# Patient Record
Sex: FEMALE | Race: WHITE | NOT HISPANIC OR LATINO | Employment: FULL TIME | URBAN - METROPOLITAN AREA
[De-identification: names, ages, dates, MRNs, and addresses within clinical notes are randomized per-mention and may not be internally consistent; named-entity substitution may affect disease eponyms.]

---

## 2020-03-20 ENCOUNTER — OFFICE VISIT (OUTPATIENT)
Dept: URGENT CARE | Facility: CLINIC | Age: 40
End: 2020-03-20
Payer: COMMERCIAL

## 2020-03-20 VITALS
DIASTOLIC BLOOD PRESSURE: 88 MMHG | HEIGHT: 67 IN | RESPIRATION RATE: 20 BRPM | BODY MASS INDEX: 32.96 KG/M2 | OXYGEN SATURATION: 100 % | TEMPERATURE: 97.7 F | HEART RATE: 101 BPM | SYSTOLIC BLOOD PRESSURE: 138 MMHG | WEIGHT: 210 LBS

## 2020-03-20 DIAGNOSIS — J40 BRONCHITIS: ICD-10-CM

## 2020-03-20 PROCEDURE — 99214 OFFICE O/P EST MOD 30 MIN: CPT | Performed by: PHYSICIAN ASSISTANT

## 2020-03-20 RX ORDER — ALBUTEROL SULFATE 90 UG/1
2 AEROSOL, METERED RESPIRATORY (INHALATION) EVERY 6 HOURS PRN
Qty: 1 INHALER | Refills: 0 | Status: SHIPPED | OUTPATIENT
Start: 2020-03-20 | End: 2020-07-24

## 2020-03-20 RX ORDER — PREDNISONE 20 MG/1
TABLET ORAL
Qty: 10 TAB | Refills: 0 | Status: SHIPPED | OUTPATIENT
Start: 2020-03-20 | End: 2020-07-24

## 2020-03-20 RX ORDER — AZITHROMYCIN 250 MG/1
TABLET, FILM COATED ORAL
Qty: 6 TAB | Refills: 0 | Status: SHIPPED | OUTPATIENT
Start: 2020-03-20 | End: 2020-07-24

## 2020-03-22 ASSESSMENT — ENCOUNTER SYMPTOMS
WHEEZING: 1
PALPITATIONS: 0
SORE THROAT: 1
SHORTNESS OF BREATH: 0
HEMOPTYSIS: 0
FEVER: 0
CHILLS: 0
COUGH: 1
SPUTUM PRODUCTION: 0

## 2020-03-22 NOTE — PROGRESS NOTES
Subjective:      Marissa Molina is a 39 y.o. female who presents with Cough (x6days, cough, wheezing, sob)            Cough   This is a new problem. The current episode started in the past 7 days. The problem occurs constantly. The cough is non-productive. Associated symptoms include a sore throat and wheezing. Pertinent negatives include no chest pain, chills, ear pain, fever, hemoptysis or shortness of breath. Nothing aggravates the symptoms. She has tried OTC cough suppressant for the symptoms. The treatment provided no relief.       Review of Systems   Constitutional: Positive for malaise/fatigue. Negative for chills and fever.   HENT: Positive for congestion and sore throat. Negative for ear pain.    Respiratory: Positive for cough and wheezing. Negative for hemoptysis, sputum production and shortness of breath.    Cardiovascular: Negative for chest pain and palpitations.   All other systems reviewed and are negative.    PMH:  has no past medical history on file.  MEDS:   Current Outpatient Medications:   •  predniSONE (DELTASONE) 20 MG Tab, Take 40 mg by mouth once daily for 5 days., Disp: 10 Tab, Rfl: 0  •  albuterol (PROAIR HFA) 108 (90 Base) MCG/ACT Aero Soln inhalation aerosol, Inhale 2 Puffs by mouth every 6 hours as needed for Shortness of Breath., Disp: 1 Inhaler, Rfl: 0  •  azithromycin (ZITHROMAX) 250 MG Tab, Take 500 mg (2 Tabs) by mouth on day one; then take 250 mg (1 Tab) by mouth once daily for 4 days., Disp: 6 Tab, Rfl: 0  ALLERGIES:   Allergies   Allergen Reactions   • Penicillins Hives     SURGHX: History reviewed. No pertinent surgical history.  SOCHX:  reports that she has been smoking. She has never used smokeless tobacco.  FH: Family history was reviewed, no pertinent findings to report  Medications, Allergies, and current problem list reviewed today in Epic       Objective:     Blood Pressure 138/88 (BP Location: Left arm, Patient Position: Sitting, BP Cuff Size: Adult)   Pulse (Abnormal)  "101   Temperature 36.5 °C (97.7 °F) (Temporal)   Respiration 20   Height 1.702 m (5' 7\")   Weight 95.3 kg (210 lb)   Oxygen Saturation 100%   Body Mass Index 32.89 kg/m²      Physical Exam  Vitals signs reviewed.   Constitutional:       General: She is not in acute distress.     Appearance: She is well-developed. She is not ill-appearing or toxic-appearing.      Interventions: She is not intubated.  HENT:      Head: Normocephalic and atraumatic.      Right Ear: Hearing, tympanic membrane, ear canal and external ear normal.      Left Ear: Hearing, tympanic membrane, ear canal and external ear normal.      Nose: Nose normal.      Mouth/Throat:      Pharynx: Uvula midline.   Eyes:      General: Lids are normal.      Conjunctiva/sclera: Conjunctivae normal.   Neck:      Musculoskeletal: Full passive range of motion without pain, normal range of motion and neck supple.   Cardiovascular:      Rate and Rhythm: Regular rhythm.      Heart sounds: Normal heart sounds, S1 normal and S2 normal. No murmur. No friction rub. No gallop.    Pulmonary:      Effort: Pulmonary effort is normal. No tachypnea, bradypnea, accessory muscle usage or respiratory distress. She is not intubated.      Breath sounds: Wheezing present. No decreased breath sounds, rhonchi or rales.   Chest:      Chest wall: No tenderness.   Musculoskeletal: Normal range of motion.   Skin:     General: Skin is warm and dry.   Neurological:      Mental Status: She is alert and oriented to person, place, and time.   Psychiatric:         Speech: Speech normal.         Behavior: Behavior normal.         Thought Content: Thought content normal.         Judgment: Judgment normal.                 Assessment/Plan:   Discussed symptoms are likely a result of a viral process.  Contingent antibiotic prescription given to patient to fill upon meeting criteria of guidelines discussed.     1. Bronchitis    - predniSONE (DELTASONE) 20 MG Tab; Take 40 mg by mouth once daily " for 5 days.  Dispense: 10 Tab; Refill: 0  - albuterol (PROAIR HFA) 108 (90 Base) MCG/ACT Aero Soln inhalation aerosol; Inhale 2 Puffs by mouth every 6 hours as needed for Shortness of Breath.  Dispense: 1 Inhaler; Refill: 0  - azithromycin (ZITHROMAX) 250 MG Tab; Take 500 mg (2 Tabs) by mouth on day one; then take 250 mg (1 Tab) by mouth once daily for 4 days.  Dispense: 6 Tab; Refill: 0    Differential diagnosis, natural history, supportive care discussed. Follow-up with primary care provider within 7-10 days, emergency room precautions discussed.  Patient and/or family appears understanding of information.  Handout and review of patients diagnosis and treatment was discussed extensively.

## 2020-07-23 ENCOUNTER — TELEPHONE (OUTPATIENT)
Dept: SCHEDULING | Facility: IMAGING CENTER | Age: 40
End: 2020-07-23

## 2020-07-23 SDOH — ECONOMIC STABILITY: HOUSING INSECURITY: IN THE LAST 12 MONTHS, HOW MANY PLACES HAVE YOU LIVED?: 1

## 2020-07-23 SDOH — HEALTH STABILITY: MENTAL HEALTH
STRESS IS WHEN SOMEONE FEELS TENSE, NERVOUS, ANXIOUS, OR CAN'T SLEEP AT NIGHT BECAUSE THEIR MIND IS TROUBLED. HOW STRESSED ARE YOU?: NOT AT ALL

## 2020-07-23 SDOH — ECONOMIC STABILITY: HOUSING INSECURITY
IN THE LAST 12 MONTHS, WAS THERE A TIME WHEN YOU DID NOT HAVE A STEADY PLACE TO SLEEP OR SLEPT IN A SHELTER (INCLUDING NOW)?: NO

## 2020-07-23 SDOH — ECONOMIC STABILITY: TRANSPORTATION INSECURITY
IN THE PAST 12 MONTHS, HAS THE LACK OF TRANSPORTATION KEPT YOU FROM MEDICAL APPOINTMENTS OR FROM GETTING MEDICATIONS?: NO

## 2020-07-23 SDOH — HEALTH STABILITY: PHYSICAL HEALTH: ON AVERAGE, HOW MANY MINUTES DO YOU ENGAGE IN EXERCISE AT THIS LEVEL?: 0 MINUTES

## 2020-07-23 SDOH — ECONOMIC STABILITY: INCOME INSECURITY: IN THE LAST 12 MONTHS, WAS THERE A TIME WHEN YOU WERE NOT ABLE TO PAY THE MORTGAGE OR RENT ON TIME?: NO

## 2020-07-23 SDOH — ECONOMIC STABILITY: TRANSPORTATION INSECURITY
IN THE PAST 12 MONTHS, HAS LACK OF RELIABLE TRANSPORTATION KEPT YOU FROM MEDICAL APPOINTMENTS, MEETINGS, WORK OR FROM GETTING THINGS NEEDED FOR DAILY LIVING?: NO

## 2020-07-23 SDOH — HEALTH STABILITY: PHYSICAL HEALTH: ON AVERAGE, HOW MANY DAYS PER WEEK DO YOU ENGAGE IN MODERATE TO STRENUOUS EXERCISE (LIKE A BRISK WALK)?: 0 DAYS

## 2020-07-23 ASSESSMENT — SOCIAL DETERMINANTS OF HEALTH (SDOH)
WITHIN THE PAST 12 MONTHS, YOU WORRIED THAT YOUR FOOD WOULD RUN OUT BEFORE YOU GOT THE MONEY TO BUY MORE: DECLINE
WITHIN THE PAST 12 MONTHS, THE FOOD YOU BOUGHT JUST DIDN'T LAST AND YOU DIDN'T HAVE MONEY TO GET MORE: DECLINE
HOW OFTEN DO YOU GET TOGETHER WITH FRIENDS OR RELATIVES?: NEVER
HOW HARD IS IT FOR YOU TO PAY FOR THE VERY BASICS LIKE FOOD, HOUSING, MEDICAL CARE, AND HEATING?: DECLINE
HOW OFTEN DO YOU ATTEND CHURCH OR RELIGIOUS SERVICES?: NEVER
HOW OFTEN DO YOU ATTENT MEETINGS OF THE CLUB OR ORGANIZATION YOU BELONG TO?: NEVER
DO YOU BELONG TO ANY CLUBS OR ORGANIZATIONS SUCH AS CHURCH GROUPS UNIONS, FRATERNAL OR ATHLETIC GROUPS, OR SCHOOL GROUPS?: NO
IN A TYPICAL WEEK, HOW MANY TIMES DO YOU TALK ON THE PHONE WITH FAMILY, FRIENDS, OR NEIGHBORS?: NEVER
HOW MANY DRINKS CONTAINING ALCOHOL DO YOU HAVE ON A TYPICAL DAY WHEN YOU ARE DRINKING: 5 OR 6
HOW OFTEN DO YOU HAVE SIX OR MORE DRINKS ON ONE OCCASION: NEVER
HOW OFTEN DO YOU HAVE A DRINK CONTAINING ALCOHOL: 2-4 TIMES A MONTH

## 2020-07-24 ENCOUNTER — OFFICE VISIT (OUTPATIENT)
Dept: MEDICAL GROUP | Age: 40
End: 2020-07-24
Payer: COMMERCIAL

## 2020-07-24 ENCOUNTER — HOSPITAL ENCOUNTER (OUTPATIENT)
Dept: LAB | Facility: MEDICAL CENTER | Age: 40
End: 2020-07-24
Attending: FAMILY MEDICINE
Payer: COMMERCIAL

## 2020-07-24 VITALS
SYSTOLIC BLOOD PRESSURE: 112 MMHG | HEART RATE: 89 BPM | OXYGEN SATURATION: 100 % | TEMPERATURE: 98.3 F | BODY MASS INDEX: 35.82 KG/M2 | HEIGHT: 67 IN | WEIGHT: 228.2 LBS | DIASTOLIC BLOOD PRESSURE: 82 MMHG

## 2020-07-24 DIAGNOSIS — Z00.00 ANNUAL PHYSICAL EXAM: ICD-10-CM

## 2020-07-24 DIAGNOSIS — Z00.00 ENCOUNTER FOR MEDICAL EXAMINATION TO ESTABLISH CARE: ICD-10-CM

## 2020-07-24 DIAGNOSIS — F17.200 SMOKING: ICD-10-CM

## 2020-07-24 DIAGNOSIS — E66.9 OBESITY (BMI 30-39.9): ICD-10-CM

## 2020-07-24 DIAGNOSIS — R23.4 CRACKED SKIN ON FEET: ICD-10-CM

## 2020-07-24 LAB
25(OH)D3 SERPL-MCNC: 30 NG/ML (ref 30–100)
ALBUMIN SERPL BCP-MCNC: 4.7 G/DL (ref 3.2–4.9)
ALBUMIN/GLOB SERPL: 1.7 G/DL
ALP SERPL-CCNC: 60 U/L (ref 30–99)
ALT SERPL-CCNC: 26 U/L (ref 2–50)
ANION GAP SERPL CALC-SCNC: 17 MMOL/L (ref 7–16)
AST SERPL-CCNC: 19 U/L (ref 12–45)
BILIRUB SERPL-MCNC: 0.4 MG/DL (ref 0.1–1.5)
BUN SERPL-MCNC: 11 MG/DL (ref 8–22)
CALCIUM SERPL-MCNC: 9.5 MG/DL (ref 8.5–10.5)
CHLORIDE SERPL-SCNC: 101 MMOL/L (ref 96–112)
CHOLEST SERPL-MCNC: 209 MG/DL (ref 100–199)
CO2 SERPL-SCNC: 19 MMOL/L (ref 20–33)
CREAT SERPL-MCNC: 0.82 MG/DL (ref 0.5–1.4)
ERYTHROCYTE [DISTWIDTH] IN BLOOD BY AUTOMATED COUNT: 41.9 FL (ref 35.9–50)
FASTING STATUS PATIENT QL REPORTED: NORMAL
GLOBULIN SER CALC-MCNC: 2.8 G/DL (ref 1.9–3.5)
GLUCOSE SERPL-MCNC: 87 MG/DL (ref 65–99)
HCT VFR BLD AUTO: 46.8 % (ref 37–47)
HDLC SERPL-MCNC: 59 MG/DL
HGB BLD-MCNC: 15.8 G/DL (ref 12–16)
LDLC SERPL CALC-MCNC: 135 MG/DL
MCH RBC QN AUTO: 29.8 PG (ref 27–33)
MCHC RBC AUTO-ENTMCNC: 33.8 G/DL (ref 33.6–35)
MCV RBC AUTO: 88.1 FL (ref 81.4–97.8)
PLATELET # BLD AUTO: 213 K/UL (ref 164–446)
PMV BLD AUTO: 12.3 FL (ref 9–12.9)
POTASSIUM SERPL-SCNC: 4 MMOL/L (ref 3.6–5.5)
PROT SERPL-MCNC: 7.5 G/DL (ref 6–8.2)
RBC # BLD AUTO: 5.31 M/UL (ref 4.2–5.4)
SODIUM SERPL-SCNC: 137 MMOL/L (ref 135–145)
T3FREE SERPL-MCNC: 3.66 PG/ML (ref 2–4.4)
T4 FREE SERPL-MCNC: 1.41 NG/DL (ref 0.93–1.7)
TRIGL SERPL-MCNC: 75 MG/DL (ref 0–149)
TSH SERPL DL<=0.005 MIU/L-ACNC: 1.06 UIU/ML (ref 0.38–5.33)
WBC # BLD AUTO: 10 K/UL (ref 4.8–10.8)

## 2020-07-24 PROCEDURE — 36415 COLL VENOUS BLD VENIPUNCTURE: CPT

## 2020-07-24 PROCEDURE — 80061 LIPID PANEL: CPT

## 2020-07-24 PROCEDURE — 80053 COMPREHEN METABOLIC PANEL: CPT

## 2020-07-24 PROCEDURE — 85027 COMPLETE CBC AUTOMATED: CPT

## 2020-07-24 PROCEDURE — 82306 VITAMIN D 25 HYDROXY: CPT

## 2020-07-24 PROCEDURE — 99214 OFFICE O/P EST MOD 30 MIN: CPT | Performed by: FAMILY MEDICINE

## 2020-07-24 PROCEDURE — 84439 ASSAY OF FREE THYROXINE: CPT

## 2020-07-24 PROCEDURE — 84481 FREE ASSAY (FT-3): CPT

## 2020-07-24 PROCEDURE — 84443 ASSAY THYROID STIM HORMONE: CPT

## 2020-07-24 RX ORDER — BUPROPION HYDROCHLORIDE 150 MG/1
TABLET ORAL
Qty: 90 TAB | Refills: 0 | Status: SHIPPED | OUTPATIENT
Start: 2020-07-24 | End: 2020-08-17

## 2020-07-24 RX ORDER — CLOBETASOL PROPIONATE 0.5 MG/G
EMULSION TOPICAL
Qty: 75 G | Refills: 2 | Status: SHIPPED | OUTPATIENT
Start: 2020-07-24 | End: 2021-02-24

## 2020-07-24 RX ORDER — NICOTINE 21 MG/24HR
PATCH, TRANSDERMAL 24 HOURS TRANSDERMAL
Qty: 42 PATCH | Refills: 0 | Status: SHIPPED | OUTPATIENT
Start: 2020-07-24 | End: 2021-03-12

## 2020-07-24 ASSESSMENT — PATIENT HEALTH QUESTIONNAIRE - PHQ9: CLINICAL INTERPRETATION OF PHQ2 SCORE: 0

## 2020-07-24 NOTE — PROGRESS NOTES
This medical record contains text that has been entered with the assistance of computer voice recognition and dictation software.  Therefore, it may contain unintended errors in text, spelling, punctuation, or grammar      Chief Complaint   Patient presents with   • Establish Care   • Stress   • Weight Gain     x6 months   • Tired   • Body Aches         Marissa Molina is a 39 y.o. female here evaluation and management of: establish care      HPI:     1. Encounter for medical examination to establish care  The patient is a 39-year-old female who presents to clinic with a chief complaint of wanting to establish care and cracked skin in the feet as well as smoking cessation help.    Today the patient denied any chest pain, shortness of breath, fevers or chills.    Past medical history above and below    Family History of Cancer---MGM  of leukemia at age 31    Family History of CAD---none      Social History        Occupation----works at [employer]    Exercise---not as much anymore      Pets---2 dogs, and 2 guinee pigs,  doodle and husky (Bk and Ace)        2. Obesity (BMI 30-39.9)  Patient states she just feels tired so that is why she does not exercise she comes home from work and she is absolutely tired.  She knows she needs to start exercise regimen.    4. Smoking  Patient states she is tried smoking cessation in the past Chantix made her feel very depressed she tried the nicotine patch we did not work.  But she is interested in stopping.  Denies any shortness of breath no coughing up blood.    5. Cracked skin on feet  Patient states she has this dry itchy flaky cracked skin on bilateral heels.  Current medicines (including changes today)  Current Outpatient Medications   Medication Sig Dispense Refill   • nicotine (NICODERM) 21 MG/24HR PATCH 24 HR NRT patch 21mg/day x 6wks followed by 14mg/day x 2wks followed by 7mg/day x 2wks, in combination with  buproprion for 7-12 wks. 42 Patch 0   •  "buPROPion (WELLBUTRIN XL) 150 MG XL tablet 150 mg once daily for 3 days; increase to 150 mg twice daily (maximum dose: 300 mg/day 90 Tab 0   • CLOBETASOL PROPIONATE E 0.05 % Cream AAA BID FOR UP TO 14 DAYS THEN STOP 75 g 2     No current facility-administered medications for this visit.      She  has no past medical history on file.  She  has a past surgical history that includes primary c section.  Social History     Tobacco Use   • Smoking status: Current Some Day Smoker     Packs/day: 0.75   • Smokeless tobacco: Never Used   Substance Use Topics   • Alcohol Use     Frequency: 2-4 times a month     Drinks per session: 5 or 6     Binge frequency: Never   • Drug use: Not on file     Social History     Social History Narrative   • Not on file     Family History   Problem Relation Age of Onset   • No Known Problems Mother    • No Known Problems Father    • No Known Problems Sister    • No Known Problems Brother    • No Known Problems Sister      Family Status   Relation Name Status   • Mo  Alive   • Fa  Alive   • Sis  Alive   • Bro  Alive   • Sis  Alive         ROS    The pertinent  ROS findings can be seen in the HPI above.     All other systems reviewed and are negative     Objective:     /82 (BP Location: Left arm, Patient Position: Sitting, BP Cuff Size: Adult)   Pulse 89   Temp 36.8 °C (98.3 °F) (Temporal)   Ht 1.702 m (5' 7\")   Wt 103.5 kg (228 lb 3.2 oz)   SpO2 100%  Body mass index is 35.74 kg/m².      Physical Exam:    Constitutional: Alert, no distress.  Skin: Dry flaky skin noted on bilateral heels  Eye: Equal, round and reactive, conjunctiva clear, lids normal.  ENMT: Lips without lesions, good dentition, oropharynx clear.  Neck: Trachea midline, no masses, no thyromegaly. No cervical or supraclavicular lymphadenopathy.  Respiratory: Unlabored respiratory effort, lungs clear to auscultation, no wheezes, no ronchi.  Cardiovascular: Normal S1, S2, no murmur, no edema  Abdomen: Soft, non-tender, no " masses, no hepatosplenomegaly.        Assessment and Plan:   The following treatment plan was discussed      1. Encounter for medical examination to establish care  Care has been established  We need  baseline labs to establish a clinical profile      Age-appropriate preventive services recommended by USPTF guidelines and ACIP were discussed today.    Requested any outside Medical records to be sent to us  Denies intimate partner viloence  Discussed seat belt safety        2. Annual physical exam    Due for annual labs    - Comp Metabolic Panel; Future  - CBC WITHOUT DIFFERENTIAL; Future  - Lipid Profile; Future  - TSH+FREE T4  - T3 FREE; Future  - VITAMIN D,25 HYDROXY; Future    3. Obesity (BMI 30-39.9)  We specifically discussed a comprehensive lifestyle intervention which includes combination of diet exercise and behavioral modification.  We discussed self-monitoring of weight, food intake and exercise logging.  We discussed that people with obesity have learned maladaptive patterns of eating and exercise that are contributing to their weight gain or maintenance of their overweight state.  The second is that behaviors can be modified and that weight loss will result if the patient is persisting.      4. Smoking    Patient was counseled on smoking cessation, we discussed the benefits of quitting including decrease risk of MI by 50% after one year of cessation, decreased risk of lung cancer after 12 years, one cigarette is enough to paralyze cilia for 24 hours leading to increase risk of pulmonary infection, etc.... .Also encouraged to set a stop date.    Patient desires to begin pharmacotherapy to prevent withdrawal symptoms and aid in cessation. Will use NRT patch 21mg/day x 6wks followed by 14mg/day x 2wks followed by 7mg/day x 2wks, in combination with  buproprion for 7-12 wks.    - nicotine (NICODERM) 21 MG/24HR PATCH 24 HR; NRT patch 21mg/day x 6wks followed by 14mg/day x 2wks followed by 7mg/day x 2wks, in  combination with  buproprion for 7-12 wks.  Dispense: 42 Patch; Refill: 0  - buPROPion (WELLBUTRIN XL) 150 MG XL tablet; 150 mg once daily for 3 days; increase to 150 mg twice daily (maximum dose: 300 mg/day  Dispense: 90 Tab; Refill: 0    5. Cracked skin on feet    - CLOBETASOL PROPIONATE E 0.05 % Cream; AAA BID FOR UP TO 14 DAYS THEN STOP  Dispense: 75 g; Refill: 2        Instructed to Follow up in clinic or ER for worsening symptoms, difficulty breathing, lack of expected recovery, or should new symptoms or problems arise.    Followup: Return in about 3 months (around 10/24/2020) for Reevaluation, labs.       Once again this medical record contains text that has been entered with the assistance of computer voice recognition and dictation software.  Therefore, it may contain unintended errors in text, spelling, punctuation, or grammar

## 2020-08-16 DIAGNOSIS — F17.200 SMOKING: ICD-10-CM

## 2020-08-19 RX ORDER — BUPROPION HYDROCHLORIDE 150 MG/1
TABLET ORAL
Qty: 60 TAB | Refills: 0 | Status: SHIPPED | OUTPATIENT
Start: 2020-08-19 | End: 2020-09-13 | Stop reason: SDUPTHER

## 2020-08-25 RX ORDER — NICOTINE 21 MG/24HR
1 PATCH, TRANSDERMAL 24 HOURS TRANSDERMAL EVERY 24 HOURS
Qty: 30 PATCH | Refills: 0 | Status: SHIPPED | OUTPATIENT
Start: 2020-08-25 | End: 2021-03-12

## 2020-09-13 DIAGNOSIS — F17.200 SMOKING: ICD-10-CM

## 2020-09-14 RX ORDER — BUPROPION HYDROCHLORIDE 150 MG/1
TABLET ORAL
Qty: 60 TAB | Refills: 0 | Status: SHIPPED | OUTPATIENT
Start: 2020-09-14 | End: 2020-10-08

## 2020-10-08 DIAGNOSIS — F17.200 SMOKING: ICD-10-CM

## 2020-10-08 RX ORDER — BUPROPION HYDROCHLORIDE 150 MG/1
TABLET ORAL
Qty: 180 TAB | Refills: 0 | Status: SHIPPED | OUTPATIENT
Start: 2020-10-08 | End: 2020-10-20 | Stop reason: SDUPTHER

## 2020-10-20 DIAGNOSIS — F17.200 SMOKING: ICD-10-CM

## 2020-10-21 RX ORDER — BUPROPION HYDROCHLORIDE 150 MG/1
TABLET ORAL
Qty: 180 TAB | Refills: 0 | Status: SHIPPED | OUTPATIENT
Start: 2020-10-21 | End: 2020-12-16 | Stop reason: SDUPTHER

## 2020-12-16 DIAGNOSIS — F17.200 SMOKING: ICD-10-CM

## 2020-12-18 RX ORDER — BUPROPION HYDROCHLORIDE 150 MG/1
TABLET ORAL
Qty: 180 TAB | Refills: 0 | Status: SHIPPED | OUTPATIENT
Start: 2020-12-18 | End: 2021-03-08

## 2021-02-18 ENCOUNTER — OFFICE VISIT (OUTPATIENT)
Dept: URGENT CARE | Facility: CLINIC | Age: 41
End: 2021-02-18
Payer: COMMERCIAL

## 2021-02-18 ENCOUNTER — APPOINTMENT (OUTPATIENT)
Dept: RADIOLOGY | Facility: IMAGING CENTER | Age: 41
End: 2021-02-18
Attending: PHYSICIAN ASSISTANT
Payer: COMMERCIAL

## 2021-02-18 ENCOUNTER — HOSPITAL ENCOUNTER (EMERGENCY)
Facility: MEDICAL CENTER | Age: 41
End: 2021-02-19
Attending: EMERGENCY MEDICINE | Admitting: EMERGENCY MEDICINE
Payer: COMMERCIAL

## 2021-02-18 VITALS
WEIGHT: 240 LBS | HEIGHT: 66 IN | HEART RATE: 115 BPM | SYSTOLIC BLOOD PRESSURE: 124 MMHG | BODY MASS INDEX: 38.57 KG/M2 | TEMPERATURE: 97.5 F | OXYGEN SATURATION: 98 % | DIASTOLIC BLOOD PRESSURE: 80 MMHG | RESPIRATION RATE: 16 BRPM

## 2021-02-18 DIAGNOSIS — R07.9 RIGHT-SIDED CHEST PAIN: ICD-10-CM

## 2021-02-18 DIAGNOSIS — R94.31 ABNORMAL EKG: ICD-10-CM

## 2021-02-18 DIAGNOSIS — S22.31XA CLOSED FRACTURE OF ONE RIB OF RIGHT SIDE, INITIAL ENCOUNTER: ICD-10-CM

## 2021-02-18 LAB
ALBUMIN SERPL BCP-MCNC: 4.1 G/DL (ref 3.2–4.9)
ALBUMIN/GLOB SERPL: 1.4 G/DL
ALP SERPL-CCNC: 69 U/L (ref 30–99)
ALT SERPL-CCNC: 25 U/L (ref 2–50)
ANION GAP SERPL CALC-SCNC: 10 MMOL/L (ref 7–16)
AST SERPL-CCNC: 23 U/L (ref 12–45)
BASOPHILS # BLD AUTO: 0.5 % (ref 0–1.8)
BASOPHILS # BLD: 0.09 K/UL (ref 0–0.12)
BILIRUB SERPL-MCNC: 0.4 MG/DL (ref 0.1–1.5)
BUN SERPL-MCNC: 16 MG/DL (ref 8–22)
CALCIUM SERPL-MCNC: 9 MG/DL (ref 8.4–10.2)
CHLORIDE SERPL-SCNC: 104 MMOL/L (ref 96–112)
CO2 SERPL-SCNC: 21 MMOL/L (ref 20–33)
CREAT SERPL-MCNC: 0.92 MG/DL (ref 0.5–1.4)
EKG IMPRESSION: NORMAL
EOSINOPHIL # BLD AUTO: 0.25 K/UL (ref 0–0.51)
EOSINOPHIL NFR BLD: 1.4 % (ref 0–6.9)
ERYTHROCYTE [DISTWIDTH] IN BLOOD BY AUTOMATED COUNT: 42.7 FL (ref 35.9–50)
GLOBULIN SER CALC-MCNC: 3 G/DL (ref 1.9–3.5)
GLUCOSE SERPL-MCNC: 115 MG/DL (ref 65–99)
HCG SERPL QL: NEGATIVE
HCT VFR BLD AUTO: 43.9 % (ref 37–47)
HGB BLD-MCNC: 14.4 G/DL (ref 12–16)
IMM GRANULOCYTES # BLD AUTO: 0.06 K/UL (ref 0–0.11)
IMM GRANULOCYTES NFR BLD AUTO: 0.3 % (ref 0–0.9)
LIPASE SERPL-CCNC: 24 U/L (ref 7–58)
LYMPHOCYTES # BLD AUTO: 3.03 K/UL (ref 1–4.8)
LYMPHOCYTES NFR BLD: 17.6 % (ref 22–41)
MCH RBC QN AUTO: 28.6 PG (ref 27–33)
MCHC RBC AUTO-ENTMCNC: 32.8 G/DL (ref 33.6–35)
MCV RBC AUTO: 87.3 FL (ref 81.4–97.8)
MONOCYTES # BLD AUTO: 0.89 K/UL (ref 0–0.85)
MONOCYTES NFR BLD AUTO: 5.2 % (ref 0–13.4)
NEUTROPHILS # BLD AUTO: 12.93 K/UL (ref 2–7.15)
NEUTROPHILS NFR BLD: 75 % (ref 44–72)
NRBC # BLD AUTO: 0 K/UL
NRBC BLD-RTO: 0 /100 WBC
PLATELET # BLD AUTO: 214 K/UL (ref 164–446)
PMV BLD AUTO: 11.5 FL (ref 9–12.9)
POTASSIUM SERPL-SCNC: 3.6 MMOL/L (ref 3.6–5.5)
PROT SERPL-MCNC: 7.1 G/DL (ref 6–8.2)
RBC # BLD AUTO: 5.03 M/UL (ref 4.2–5.4)
SODIUM SERPL-SCNC: 135 MMOL/L (ref 135–145)
TROPONIN T SERPL-MCNC: 12 NG/L (ref 6–19)
WBC # BLD AUTO: 17.3 K/UL (ref 4.8–10.8)

## 2021-02-18 PROCEDURE — 85379 FIBRIN DEGRADATION QUANT: CPT

## 2021-02-18 PROCEDURE — 36415 COLL VENOUS BLD VENIPUNCTURE: CPT

## 2021-02-18 PROCEDURE — 71046 X-RAY EXAM CHEST 2 VIEWS: CPT | Mod: TC | Performed by: PHYSICIAN ASSISTANT

## 2021-02-18 PROCEDURE — 93000 ELECTROCARDIOGRAM COMPLETE: CPT | Performed by: PHYSICIAN ASSISTANT

## 2021-02-18 PROCEDURE — 83690 ASSAY OF LIPASE: CPT

## 2021-02-18 PROCEDURE — 85025 COMPLETE CBC W/AUTO DIFF WBC: CPT

## 2021-02-18 PROCEDURE — 80053 COMPREHEN METABOLIC PANEL: CPT

## 2021-02-18 PROCEDURE — 99283 EMERGENCY DEPT VISIT LOW MDM: CPT

## 2021-02-18 PROCEDURE — 93005 ELECTROCARDIOGRAM TRACING: CPT | Performed by: EMERGENCY MEDICINE

## 2021-02-18 PROCEDURE — 99215 OFFICE O/P EST HI 40 MIN: CPT | Performed by: PHYSICIAN ASSISTANT

## 2021-02-18 PROCEDURE — 84484 ASSAY OF TROPONIN QUANT: CPT

## 2021-02-18 PROCEDURE — 84703 CHORIONIC GONADOTROPIN ASSAY: CPT

## 2021-02-18 ASSESSMENT — ENCOUNTER SYMPTOMS
WHEEZING: 0
FEVER: 0
SENSORY CHANGE: 0
NEAR-SYNCOPE: 0
SHORTNESS OF BREATH: 0
SYNCOPE: 0
EXERTIONAL CHEST PRESSURE: 0
CHILLS: 0
LOWER EXTREMITY EDEMA: 0
PALPITATIONS: 0
COUGH: 0
HEMOPTYSIS: 0
FOCAL WEAKNESS: 0
LEG PAIN: 0
SPUTUM PRODUCTION: 0
IRREGULAR HEARTBEAT: 0
NAUSEA: 0
BACK PAIN: 0
TINGLING: 0
VOMITING: 0
ABDOMINAL PAIN: 0
DIZZINESS: 0

## 2021-02-18 ASSESSMENT — FIBROSIS 4 INDEX
FIB4 SCORE: 0.68
FIB4 SCORE: 0.68

## 2021-02-19 ENCOUNTER — APPOINTMENT (OUTPATIENT)
Dept: RADIOLOGY | Facility: MEDICAL CENTER | Age: 41
End: 2021-02-19
Attending: EMERGENCY MEDICINE
Payer: COMMERCIAL

## 2021-02-19 VITALS
HEIGHT: 66 IN | TEMPERATURE: 98.2 F | BODY MASS INDEX: 38.62 KG/M2 | OXYGEN SATURATION: 99 % | HEART RATE: 78 BPM | WEIGHT: 240.3 LBS | SYSTOLIC BLOOD PRESSURE: 122 MMHG | RESPIRATION RATE: 16 BRPM | DIASTOLIC BLOOD PRESSURE: 86 MMHG

## 2021-02-19 LAB
D DIMER PPP IA.FEU-MCNC: 1.27 UG/ML (FEU) (ref 0–0.5)
SARS-COV-2 RNA RESP QL NAA+PROBE: NOTDETECTED
SPECIMEN SOURCE: NORMAL

## 2021-02-19 PROCEDURE — U0005 INFEC AGEN DETEC AMPLI PROBE: HCPCS

## 2021-02-19 PROCEDURE — 71275 CT ANGIOGRAPHY CHEST: CPT

## 2021-02-19 PROCEDURE — 700101 HCHG RX REV CODE 250: Performed by: EMERGENCY MEDICINE

## 2021-02-19 PROCEDURE — U0003 INFECTIOUS AGENT DETECTION BY NUCLEIC ACID (DNA OR RNA); SEVERE ACUTE RESPIRATORY SYNDROME CORONAVIRUS 2 (SARS-COV-2) (CORONAVIRUS DISEASE [COVID-19]), AMPLIFIED PROBE TECHNIQUE, MAKING USE OF HIGH THROUGHPUT TECHNOLOGIES AS DESCRIBED BY CMS-2020-01-R: HCPCS

## 2021-02-19 PROCEDURE — 700117 HCHG RX CONTRAST REV CODE 255: Performed by: EMERGENCY MEDICINE

## 2021-02-19 RX ORDER — KETOROLAC TROMETHAMINE 10 MG/1
10 TABLET, FILM COATED ORAL EVERY 4 HOURS PRN
Qty: 20 TABLET | Refills: 0 | Status: SHIPPED | OUTPATIENT
Start: 2021-02-19 | End: 2021-02-24

## 2021-02-19 RX ORDER — LIDOCAINE 50 MG/G
1 PATCH TOPICAL EVERY 24 HOURS
Qty: 10 PATCH | Refills: 0 | Status: SHIPPED | OUTPATIENT
Start: 2021-02-19 | End: 2021-02-24

## 2021-02-19 RX ORDER — LIDOCAINE 50 MG/G
1 PATCH TOPICAL ONCE
Status: DISCONTINUED | OUTPATIENT
Start: 2021-02-19 | End: 2021-02-19 | Stop reason: HOSPADM

## 2021-02-19 RX ADMIN — LIDOCAINE 1 PATCH: 50 PATCH TOPICAL at 01:45

## 2021-02-19 RX ADMIN — IOHEXOL 64 ML: 350 INJECTION, SOLUTION INTRAVENOUS at 01:07

## 2021-02-19 NOTE — ED PROVIDER NOTES
ED Provider Note    CHIEF COMPLAINT  Chief Complaint   Patient presents with   • Chest Pain     for past 4 days chest pain to right side of chest, denies arm/jaw pain, sob, sweating. hurts worse with deep inspiration/cough, or twisting torso       HPI  Marissa Molina is a 40 y.o. female who presents with chief complaint of right-sided chest pain.  Pain is pleuritic.  Patient reports pain has been present for the last 4 days.  She reports it began without any trauma.  She denies any radiation into her back, she denies any associated nausea or diaphoresis.  Patient denies any associated shortness of breath or decreased exertional capacity.  Patient plays American football, she reports that they have just been doing conditioning and no hitting drills, but she has been able to complete workouts without any shortness of breath since the onset of her symptoms.  Patient reports pain is worse with deep breaths but also worse with movement of her torso, especially when she rotates her torso towards the right.  Patient denies any associated fevers or chills.  She denies any sore throat.  Patient smokes cigarettes, she has a chronic cough but denies any worsening of this.    REVIEW OF SYSTEMS  ROS    See HPI for further details. All other systems are negative.     PAST MEDICAL HISTORY   has a past medical history of Patient denies medical problems.    SOCIAL HISTORY  Social History     Tobacco Use   • Smoking status: Current Some Day Smoker     Packs/day: 0.75     Years: 20.00     Pack years: 15.00   • Smokeless tobacco: Never Used   Substance and Sexual Activity   • Alcohol use: Yes     Comment: occ    • Drug use: Never   • Sexual activity: Not Currently       SURGICAL HISTORY   has a past surgical history that includes primary c section.    CURRENT MEDICATIONS  Home Medications     Reviewed by Salazar Judd R.N. (Registered Nurse) on 02/18/21 at 2308  Med List Status: Not Addressed   Medication Last Dose Status   buPROPion  (WELLBUTRIN XL) 150 MG XL tablet  Active   CLOBETASOL PROPIONATE E 0.05 % Cream  Active   nicotine (NICODERM) 14 MG/24HR PATCH 24 HR  Active   nicotine (NICODERM) 21 MG/24HR PATCH 24 HR  Active   nicotine (NICODERM) 7 MG/24HR PATCH 24 HR  Active                ALLERGIES  Allergies   Allergen Reactions   • Penicillins Hives       PHYSICAL EXAM  Vitals:    02/18/21 2310   BP: 132/95   Pulse: (!) 102   Resp: 16   Temp: 36.8 °C (98.2 °F)   SpO2: 99%       Physical Exam   Constitutional: She is oriented to person, place, and time. She appears well-developed and well-nourished.   HENT:   Head: Normocephalic and atraumatic.   Eyes: Conjunctivae are normal.   Cardiovascular: Normal rate and regular rhythm.   Pulmonary/Chest: Effort normal and breath sounds normal.   Abdominal: Soft. Bowel sounds are normal. She exhibits no distension. There is no abdominal tenderness. There is no rebound.   Musculoskeletal:      Cervical back: Normal range of motion and neck supple.   Neurological: She is alert and oriented to person, place, and time.   Skin: Skin is warm and dry. No rash noted.   Psychiatric: She has a normal mood and affect. Her behavior is normal.         DIAGNOSTIC STUDIES / PROCEDURES    EKG  See below    LABS  Results for orders placed or performed during the hospital encounter of 02/18/21   TROPONIN   Result Value Ref Range    Troponin T 12 6 - 19 ng/L   CBC WITH DIFFERENTIAL   Result Value Ref Range    WBC 17.3 (H) 4.8 - 10.8 K/uL    RBC 5.03 4.20 - 5.40 M/uL    Hemoglobin 14.4 12.0 - 16.0 g/dL    Hematocrit 43.9 37.0 - 47.0 %    MCV 87.3 81.4 - 97.8 fL    MCH 28.6 27.0 - 33.0 pg    MCHC 32.8 (L) 33.6 - 35.0 g/dL    RDW 42.7 35.9 - 50.0 fL    Platelet Count 214 164 - 446 K/uL    MPV 11.5 9.0 - 12.9 fL    Neutrophils-Polys 75.00 (H) 44.00 - 72.00 %    Lymphocytes 17.60 (L) 22.00 - 41.00 %    Monocytes 5.20 0.00 - 13.40 %    Eosinophils 1.40 0.00 - 6.90 %    Basophils 0.50 0.00 - 1.80 %    Immature Granulocytes 0.30  0.00 - 0.90 %    Nucleated RBC 0.00 /100 WBC    Neutrophils (Absolute) 12.93 (H) 2.00 - 7.15 K/uL    Lymphs (Absolute) 3.03 1.00 - 4.80 K/uL    Monos (Absolute) 0.89 (H) 0.00 - 0.85 K/uL    Eos (Absolute) 0.25 0.00 - 0.51 K/uL    Baso (Absolute) 0.09 0.00 - 0.12 K/uL    Immature Granulocytes (abs) 0.06 0.00 - 0.11 K/uL    NRBC (Absolute) 0.00 K/uL   CMP   Result Value Ref Range    Sodium 135 135 - 145 mmol/L    Potassium 3.6 3.6 - 5.5 mmol/L    Chloride 104 96 - 112 mmol/L    Co2 21 20 - 33 mmol/L    Anion Gap 10.0 7.0 - 16.0    Glucose 115 (H) 65 - 99 mg/dL    Bun 16 8 - 22 mg/dL    Creatinine 0.92 0.50 - 1.40 mg/dL    Calcium 9.0 8.4 - 10.2 mg/dL    AST(SGOT) 23 12 - 45 U/L    ALT(SGPT) 25 2 - 50 U/L    Alkaline Phosphatase 69 30 - 99 U/L    Total Bilirubin 0.4 0.1 - 1.5 mg/dL    Albumin 4.1 3.2 - 4.9 g/dL    Total Protein 7.1 6.0 - 8.2 g/dL    Globulin 3.0 1.9 - 3.5 g/dL    A-G Ratio 1.4 g/dL   BETA-HCG QUALITATIVE SERUM   Result Value Ref Range    Beta-Hcg Qualitative Serum Negative Negative   D-DIMER   Result Value Ref Range    D-Dimer Screen 1.27 (H) 0.00 - 0.50 ug/mL (FEU)   LIPASE   Result Value Ref Range    Lipase 24 7 - 58 U/L   ESTIMATED GFR   Result Value Ref Range    GFR If African American >60 >60 mL/min/1.73 m 2    GFR If Non African American >60 >60 mL/min/1.73 m 2   EKG   Result Value Ref Range    Report       [facility] Emergency Dept.    Test Date:  2021  Pt Name:    RUSSELL BUTTERFIELD           Department: [facility]  MRN:        [id number]                      Room:  Gender:     Female                       Technician: GT  :        [id number]                   Requested By:ER TRIAGE PROTOCOL  Order #:    [id number]                    Reading MD: Sarita Rucker MD    Measurements  Intervals                                Axis  Rate:       91                           P:          49  NC:         133                          QRS:        27  QRSD:       102                           T:          34  QT:         375  QTc:        462    Interpretive Statements  EKG is normal sinus rhythm, normal axis normal intervals no ST changes  consistent with acute regional ischemia, Q waves are present in the inferior  leads  Electronically Signed On 2- 23:18:57 PST by Sarita Rucker MD           RADIOLOGY    XRay 2/18/21 2221  IMPRESSION:        1.  No focal infiltrates.  2.  Perihilar interstitial prominence and bronchial wall cuffing, appearance suggests changes of underlying bronchial inflammation, consider bronchitis.    CT-CTA CHEST PULMONARY ARTERY W/ RECONS   Final Result         1.  No pulmonary embolus appreciated.   2.  Anterior right fourth rib fracture   3.  Innumerable 1 to 4 mm pulmonary nodules with hazy ill-defined margins, predominantly peripherally within the lungs bilaterally, appearance suggests inflammatory or infectious nodules given appearance of nodule, neoplastic metastatic disease however    cannot be definitively excluded. Recommend radiographic follow-up in 3 months for further characterization and evaluation of stability.   4.  Enlarged thyroid, appearance suggests goiter, correlate with thyroid laboratory values. Additional workup as clinically appropriate.   5.  Right middle lobe pulmonary nodule, see nodule follow-up recommendations below.      Fleischner Society pulmonary nodule recommendations:   Low and High Risk: Consider CT at 3 months, PET/CT, or tissue sampling.      Low Risk - Minimal or absent history of smoking and of other known risk factors.      High Risk - History of smoking or of other known risk factors.      Note: These recommendations do not apply to lung cancer screening, patients with immunosuppression, or patients with known primary cancer.      Fleischner Society 2017 Guidelines for Management of Incidentally Detected Pulmonary Nodules in Adults                 COURSE & MEDICAL DECISION MAKING  Pertinent Labs & Imaging studies reviewed.  (See chart for details)    Patient symptoms appear most consistent with possible rib contusion versus fracture.  Pulmonary embolus is also possible in this patient with pleuritic chest pain.  ACS would be less likely especially given patient without any decreased exertional tolerance or association of pain with exertion.  Basic labs reveal a white count of 17.3, a normal troponin and a D-dimer that is positive.  Patient will have a CT PE for further evaluation.  Patient abdominal exam repeated, it remains entirely benign, I believe surgical pathology is unlikely.  Patient CT PE reveals a rib fracture, there is no associated pulmonary embolus.  There are multiple pulmonary nodules of unknown significance.  Patient without any fevers or chills, will check Covid given the current pandemic.  I stressed the importance of her following up with her primary care physician for further work-up.  She reports that she will do this, I stressed the importance of her following up for repeat CT in 3 months but that she must see her primary care physician in the next week for follow-up.  I discussed the goiter seen on CT, patient would like to do further work-up with her primary care physician rather than here in the emergency department, she reports she has been told she has had this in the past around a decade ago, and does not want to have any thyroid tests checked in the emergency department at this point.  Patient presentation inconsistent with myxedema, patient presentation inconsistent with thyroid storm.  Return precautions discussed.     The patient will return for worsening symptoms and is stable at the time of discharge. The patient verbalizes understanding and will comply.    FINAL IMPRESSION    1. Closed fracture of one rib of right side, initial encounter      Lung nodules         Electronically signed by: Chami Stein M.D., 2/18/2021 11:18 PM

## 2021-02-19 NOTE — DISCHARGE INSTRUCTIONS
Your CT showed a right rib fracture, your white count was elevated which is nonspecific and likely represents the inflammation from the fracture.  Your CT also showed some findings most consistent with a viral pneumonia, we did test you for Covid and you will need to follow-up on your MyChart in the next 24 hours for your results.  If your results are negative you will need a repeat CT in the next 3 months.  Furthermore you will definitely need to follow-up with your primary care physician, especially to check your thyroid levels; if your symptoms worsen, you develop a fever significant shortness of breath, multiple episodes of vomiting, or have any other concerns please return to the emergency department.

## 2021-02-19 NOTE — ED NOTES
Assumed patient care. Pt assesement done.  Plan of care reviewed with patient. Blood drawn and sent to lab.

## 2021-02-19 NOTE — ED NOTES
Discharge instructions provided.  Pt verbalized the understanding of discharge instructions to follow up with PCP/cardiology and to return to ER if condition worsens.  Pt ambulated out of ER without difficulty.

## 2021-02-19 NOTE — ED TRIAGE NOTES
"Bib self for above complaints.     Chief Complaint   Patient presents with   • Chest Pain     for past 4 days chest pain to right side of chest, denies arm/jaw pain, sob, sweating. hurts worse with deep inspiration/cough, or twisting torso     /95   Pulse (!) 102   Temp 36.8 °C (98.2 °F) (Temporal)   Resp 16   Ht 1.676 m (5' 6\")   Wt 109 kg (240 lb 4.8 oz)   LMP 01/25/2021 (Approximate)   SpO2 99%   Breastfeeding No   BMI 38.79 kg/m²     Pt denies known covid contacts.   "

## 2021-02-19 NOTE — PROGRESS NOTES
Subjective:      Loni Molina is a 40 y.o. female who presents with Other (sharp pain on rt chest when taking a deep breathe, certain movements )            Chest Pain   This is a new problem. Episode onset: 3-4 days  The onset quality is undetermined. The problem occurs intermittently. The problem has been waxing and waning. The pain is present in the lateral region (right side ). Pain scale: no pain at rest. 7/10 pain with certain positions and deep breathing  The quality of the pain is described as sharp. The pain does not radiate. Pertinent negatives include no abdominal pain, back pain, cough, dizziness, exertional chest pressure, fever, hemoptysis, irregular heartbeat, leg pain, lower extremity edema, malaise/fatigue, nausea, near-syncope, palpitations, shortness of breath, sputum production, syncope or vomiting. She has tried acetaminophen for the symptoms. The treatment provided no relief. Risk factors include obesity and smoking/tobacco exposure.       The patient states she recently joined a [employer] league. She states they have not been tackling yet. She does report doing drills. She notices more pain with palpation of her chest and certain Movements. Arm movement does reproduce pain. The patient states she is currently asymptomatic while sitting at rest in the office.     History reviewed. No pertinent past medical history.    Past Surgical History:   Procedure Laterality Date   • PRIMARY C SECTION         Family History   Problem Relation Age of Onset   • No Known Problems Mother    • No Known Problems Father    • No Known Problems Sister    • No Known Problems Brother    • No Known Problems Sister        Allergies   Allergen Reactions   • Penicillins Hives       Medications, Allergies, and current problem list reviewed today in Epic    Review of Systems   Constitutional: Negative for chills, fever and malaise/fatigue.   Respiratory: Negative for cough, hemoptysis, sputum production,  "shortness of breath and wheezing.    Cardiovascular: Positive for chest pain (right-sided). Negative for palpitations, leg swelling, syncope and near-syncope.   Gastrointestinal: Negative for abdominal pain, nausea and vomiting.   Musculoskeletal: Negative for back pain.   Skin: Negative for rash.   Neurological: Negative for dizziness, tingling, sensory change and focal weakness.     All other systems reviewed and are negative.        Objective:     /80   Pulse (!) 115   Temp 36.4 °C (97.5 °F) (Temporal)   Resp 16   Ht 1.676 m (5' 6\")   Wt 109 kg (240 lb)   SpO2 98%   BMI 38.74 kg/m²      Physical Exam  Constitutional:       General: She is not in acute distress.     Appearance: She is not ill-appearing.   HENT:      Head: Normocephalic and atraumatic.   Eyes:      Conjunctiva/sclera: Conjunctivae normal.   Cardiovascular:      Rate and Rhythm: Normal rate and regular rhythm.      Heart sounds: Normal heart sounds. No murmur.   Pulmonary:      Effort: Pulmonary effort is normal. No respiratory distress.      Breath sounds: Normal breath sounds. No stridor. No wheezing, rhonchi or rales.   Chest:      Chest wall: Tenderness (marked TTP with deep papation of center of right chest wall) present. No deformity, crepitus or edema.   Musculoskeletal:      Right lower leg: Normal. No swelling, deformity or tenderness. No edema.      Left lower leg: Normal. No swelling, deformity or tenderness. No edema.   Skin:     General: Skin is warm and dry.      Findings: No rash.   Neurological:      General: No focal deficit present.      Mental Status: She is alert and oriented to person, place, and time.   Psychiatric:         Mood and Affect: Mood normal.         Behavior: Behavior normal.         Thought Content: Thought content normal.         Judgment: Judgment normal.          EKG: Sinus rhythm. Consider inferior infarct- Q waves in III and  aVF         Narrative & Impression       2/18/2021 10:14 " PM     HISTORY/REASON FOR EXAM: Chest Pain     TECHNIQUE/EXAM DESCRIPTION:  PA and lateral views of the chest.     COMPARISON: None     FINDINGS:     The cardiac silhouette appears within normal limits.     The mediastinal contour appears within normal limits.  Bilateral perihilar interstitial prominence and bronchial wall cuffing is noted.     The lungs appear well expanded bilaterally.  Bilateral lungs are clear.     No significant pleural effusions are identified.     The bony structures appear age-appropriate.     IMPRESSION:        1.  No focal infiltrates.  2.  Perihilar interstitial prominence and bronchial wall cuffing, appearance suggests changes of underlying bronchial inflammation, consider bronchitis.       Assessment/Plan:     1. Right-sided chest pain  EKG - Clinic Performed    DX-CHEST-2 VIEWS   2. Abnormal EKG         - EKG - Clinic Performed  - DX-CHEST-2 VIEWS; Future      At this time, I feel the patient requires a higher level of care including closer monitoring, stat lab work and/or imaging for further evaluation for complaints of atypical chest pain.     This has been discussed with the patient and they state agreement and understanding. The patient is in no acute distress upon clinic departure and will go directly to ED without delay.     Felicity Rose P.A.-C.

## 2021-02-24 ENCOUNTER — OFFICE VISIT (OUTPATIENT)
Dept: MEDICAL GROUP | Age: 41
End: 2021-02-24
Payer: COMMERCIAL

## 2021-02-24 VITALS
SYSTOLIC BLOOD PRESSURE: 112 MMHG | BODY MASS INDEX: 37.61 KG/M2 | HEIGHT: 66 IN | HEART RATE: 87 BPM | TEMPERATURE: 99 F | DIASTOLIC BLOOD PRESSURE: 80 MMHG | OXYGEN SATURATION: 99 % | WEIGHT: 234 LBS

## 2021-02-24 DIAGNOSIS — Z23 NEED FOR VACCINATION: ICD-10-CM

## 2021-02-24 DIAGNOSIS — Z00.00 ANNUAL PHYSICAL EXAM: ICD-10-CM

## 2021-02-24 DIAGNOSIS — R91.8 PULMONARY NODULES: ICD-10-CM

## 2021-02-24 DIAGNOSIS — F17.200 SMOKING: ICD-10-CM

## 2021-02-24 DIAGNOSIS — D23.4 CYST, DERMOID, SCALP AND NECK: ICD-10-CM

## 2021-02-24 PROCEDURE — 90472 IMMUNIZATION ADMIN EACH ADD: CPT | Performed by: FAMILY MEDICINE

## 2021-02-24 PROCEDURE — 90686 IIV4 VACC NO PRSV 0.5 ML IM: CPT | Performed by: FAMILY MEDICINE

## 2021-02-24 PROCEDURE — 90715 TDAP VACCINE 7 YRS/> IM: CPT | Performed by: FAMILY MEDICINE

## 2021-02-24 PROCEDURE — 90732 PPSV23 VACC 2 YRS+ SUBQ/IM: CPT | Performed by: FAMILY MEDICINE

## 2021-02-24 PROCEDURE — 90471 IMMUNIZATION ADMIN: CPT | Performed by: FAMILY MEDICINE

## 2021-02-24 PROCEDURE — 99214 OFFICE O/P EST MOD 30 MIN: CPT | Mod: 25 | Performed by: FAMILY MEDICINE

## 2021-02-24 ASSESSMENT — FIBROSIS 4 INDEX: FIB4 SCORE: 0.84

## 2021-02-24 ASSESSMENT — PATIENT HEALTH QUESTIONNAIRE - PHQ9: CLINICAL INTERPRETATION OF PHQ2 SCORE: 0

## 2021-02-25 NOTE — PROGRESS NOTES
This medical record contains text that has been entered with the assistance of computer voice recognition and dictation software.  Therefore, it may contain unintended errors in text, spelling, punctuation, or grammar      Chief Complaint   Patient presents with   • Hospital Follow-up     ER visit 2/18         Marissa Molina is a 40 y.o. female here evaluation and management of: Hospital follow-up      HPI:     1. Pulmonary nodules  The patient is a 40-year-old female who was seen in urgent care and emergency room on 18 February 2021 for right upper chest pain.  Chest pain is worse on breathing, palpating it worsened it.  She does play tackle football and she is not sure if she suffered an injury that way.  As a result x-ray and CT scan of the chest was done to evaluate for pulmonary embolism.  The CT did not show pulmonary embolism but it did show anterior right fourth rib fracture, and multiple pulmonary nodules.  The patient does have a smoking history, she has been trying to quit.      IMPRESSION:        1.  No pulmonary embolus appreciated.  2.  Anterior right fourth rib fracture  3.  Innumerable 1 to 4 mm pulmonary nodules with hazy ill-defined margins, predominantly peripherally within the lungs bilaterally, appearance suggests inflammatory or infectious nodules given appearance of nodule, neoplastic metastatic disease however   cannot be definitively excluded. Recommend radiographic follow-up in 3 months for further characterization and evaluation of stability.  4.  Enlarged thyroid, appearance suggests goiter, correlate with thyroid laboratory values. Additional workup as clinically appropriate.  5.  Right middle lobe pulmonary nodule, see nodule follow-up recommendations below.     Fleischner Society pulmonary nodule recommendations:  Low and High Risk: Consider CT at 3 months, PET/CT, or tissue sampling.     Low Risk - Minimal or absent history of smoking and of other known risk factors.     High Risk -  History of smoking or of other known risk factors.     Note: These recommendations do not apply to lung cancer screening, patients with immunosuppression, or patients with known primary cancer.     Fleischner Society 2017 Guidelines for Management of Incidentally Detected Pulmonary Nodules in Adults    2. Cyst, dermoid, scalp and neck  The patient states that they noticed a lump on the mid neck region and recommended getting thyroid labs.  She states this lump has been there for years.    3. Smoking  Patient has been smoking three quarters of a pack for the past 20 years.  She has tried Chantix in the past and actually was successful, she stopped smoking for 5 years.  She did have some mild depression with the Chantix but she would like to restart it.    4. Annual labs  Due for annual labs    5. Need for vaccination  For PPSV23, Tdap, flu vaccine    Current medicines (including changes today)  Current Outpatient Medications   Medication Sig Dispense Refill   • varenicline (CHANTIX STARTING MONTH PAK) 0.5 MG X 11 & 1 MG X 42 tablet Days 1 to 3: 0.5 mg once daily Days 4 to 7: 0.5 mg twice daily Day 8: 1 mg twice daily for 11 weeks; 90 tablet 1   • buPROPion (WELLBUTRIN XL) 150 MG XL tablet TAKE 1 TAB BY MOUTH TWICE DAILY 180 Tab 0   • nicotine (NICODERM) 14 MG/24HR PATCH 24 HR Apply 1 Patch to skin as directed every 24 hours. 30 Patch 0   • nicotine (NICODERM) 7 MG/24HR PATCH 24 HR Apply 1 Patch to skin as directed every 24 hours. 30 Patch 0   • nicotine (NICODERM) 21 MG/24HR PATCH 24 HR NRT patch 21mg/day x 6wks followed by 14mg/day x 2wks followed by 7mg/day x 2wks, in combination with  buproprion for 7-12 wks. 42 Patch 0     No current facility-administered medications for this visit.     She  has a past medical history of Patient denies medical problems.  She  has a past surgical history that includes primary c section.  Social History     Tobacco Use   • Smoking status: Current Some Day Smoker     Packs/day: 0.75  "    Years: 20.00     Pack years: 15.00   • Smokeless tobacco: Never Used   Substance Use Topics   • Alcohol use: Yes     Comment: occ    • Drug use: Never     Social History     Social History Narrative   • Not on file     Family History   Problem Relation Age of Onset   • No Known Problems Mother    • No Known Problems Father    • No Known Problems Sister    • No Known Problems Brother    • No Known Problems Sister      Family Status   Relation Name Status   • Mo  Alive   • Fa  Alive   • Sis  Alive   • Bro  Alive   • Sis  Alive         ROS    The pertinent  ROS findings can be seen in the HPI above.     All other systems reviewed and are negative     Objective:     /80 (BP Location: Right arm, Patient Position: Sitting, BP Cuff Size: Adult)   Pulse 87   Temp 37.2 °C (99 °F) (Temporal)   Ht 1.676 m (5' 6\")   Wt 106 kg (234 lb)   SpO2 99%  Body mass index is 37.77 kg/m².      Physical Exam:    Constitutional: Alert, no distress.  Skin: There is a 1.2 cm round elliptical firm nontender subcutaneous mass in the mid neck, superficial  Eye: Equal, round and reactive, conjunctiva clear, lids normal.  ENMT: Lips without lesions, good dentition, oropharynx clear.  Neck: Trachea midline, no masses, no thyromegaly. No cervical or supraclavicular lymphadenopathy.  Respiratory: Unlabored respiratory effort, lungs clear to auscultation, no wheezes, no ronchi.  Cardiovascular: Normal S1, S2, no murmur, no edema  Abdomen: Soft, non-tender, no masses, no hepatosplenomegaly.        Assessment and Plan:   The following treatment plan was discussed      1. Pulmonary nodules    We will refer to pulmonology for possible biopsy,  Repeat CT scan was ordered for surveillance if biopsies not done.  I encouraged her to stop smoking    - CT-CHEST, HIGH RESOLUTION LUNG; Future  - REFERRAL TO PULMONARY AND SLEEP MEDICINE    2. Cyst, dermoid, scalp and neck    We will evaluate this closely with an ultrasound    - US-SOFT TISSUES OF " HEAD - NECK; Future    3. Smoking      Patient was counseled on smoking cessation, we discussed the benefits of quitting including decrease risk of MI by 50% after one year of cessation, decreased risk of lung cancer after 12 years, one cigarette is enough to paralyze cilia for 24 hours leading to increase risk of pulmonary infection, etc.... .Also encouraged to set a stop date.    Patient desires to begin pharmacotherapy to prevent withdrawal symptoms and aid in cessation.  We weighed the risk of worsening depression versus the risks of smoking and she decided she would like to start Chantix again.    - varenicline (CHANTIX STARTING MONTH PAK) 0.5 MG X 11 & 1 MG X 42 tablet; Days 1 to 3: 0.5 mg once daily Days 4 to 7: 0.5 mg twice daily Day 8: 1 mg twice daily for 11 weeks;  Dispense: 90 tablet; Refill: 1    4. Annual labs   due for annual labs    - Comp Metabolic Panel; Future  - CBC WITHOUT DIFFERENTIAL; Future  - Lipid Profile; Future  - TSH+FREE T4  - T3 FREE; Future  - VITAMIN D,25 HYDROXY; Future    5. Need for vaccination    Vaccine was administered today without adverse event.    - Pneumovax Vaccine (PPSV23)  - Tdap Vaccine =>6YO IM  - Influenza Vaccine Quad Injection (PF)        Instructed to Follow up in clinic or ER for worsening symptoms, difficulty breathing, lack of expected recovery, or should new symptoms or problems arise.    Followup: Return in about 3 months (around 5/24/2021) for Reevaluation, labs.

## 2021-03-06 DIAGNOSIS — F17.200 SMOKING: ICD-10-CM

## 2021-03-09 RX ORDER — BUPROPION HYDROCHLORIDE 150 MG/1
TABLET ORAL
Qty: 180 TABLET | Refills: 0 | Status: SHIPPED | OUTPATIENT
Start: 2021-03-09 | End: 2021-07-12 | Stop reason: SDUPTHER

## 2021-03-12 ENCOUNTER — TELEMEDICINE (OUTPATIENT)
Dept: MEDICAL GROUP | Age: 41
End: 2021-03-12
Payer: COMMERCIAL

## 2021-03-12 VITALS — BODY MASS INDEX: 37.77 KG/M2 | HEIGHT: 66 IN | WEIGHT: 235 LBS

## 2021-03-12 DIAGNOSIS — F17.200 SMOKING: ICD-10-CM

## 2021-03-12 DIAGNOSIS — R91.8 PULMONARY NODULES: ICD-10-CM

## 2021-03-12 PROCEDURE — 99213 OFFICE O/P EST LOW 20 MIN: CPT | Mod: 95,CR | Performed by: FAMILY MEDICINE

## 2021-03-12 ASSESSMENT — FIBROSIS 4 INDEX: FIB4 SCORE: 0.84

## 2021-03-12 NOTE — PROGRESS NOTES
Virtual Visit: Established Patient   This visit was conducted via Zoom using secure and encrypted videoconferencing technology. The patient was in a private location in the state of [place].    The patient's identity was confirmed and verbal consent was obtained for this virtual visit.    Subjective:   CC:   Chief Complaint   Patient presents with   • Follow-Up     [email or web address]        Marissa Molina is a 40 y.o. female presenting for evaluation and management of:    1. Smoking  2. Pulmonary nodules  Patient is a very pleasant 40-year-old female who presents to this virtual visit to follow-up on smoking cessation.  We started her on Chantix starting pack, she went from smoking 1 pack of cigarettes a day to 1 to 3 cigarettes a day.  She states now if she picks up a cigarette it tasted really bad and is not enjoyable.  Overall she feels that she can breathe better.  She denies any cough, no fevers chills night sweats no coughing up blood no unintentional weight loss.  She has not had a chance to follow-up in pulmonology for the pulmonary nodule seen.    ROS   Denies any recent fevers or chills. No nausea or vomiting. No chest pains or shortness of breath.     Allergies   Allergen Reactions   • Penicillins Hives       Current medicines (including changes today)  Current Outpatient Medications   Medication Sig Dispense Refill   • buPROPion (WELLBUTRIN XL) 150 MG XL tablet TAKE 1 TABLET TWICE A  tablet 0   • varenicline (CHANTIX STARTING MONTH SURJIT) 0.5 MG X 11 & 1 MG X 42 tablet Days 1 to 3: 0.5 mg once daily Days 4 to 7: 0.5 mg twice daily Day 8: 1 mg twice daily for 11 weeks; 90 tablet 1   • nicotine (NICODERM) 14 MG/24HR PATCH 24 HR Apply 1 Patch to skin as directed every 24 hours. 30 Patch 0   • nicotine (NICODERM) 7 MG/24HR PATCH 24 HR Apply 1 Patch to skin as directed every 24 hours. 30 Patch 0   • nicotine (NICODERM) 21 MG/24HR PATCH 24 HR NRT patch 21mg/day x 6wks followed by 14mg/day x 2wks followed by 7mg/day x  "2wks, in combination with  buproprion for 7-12 wks. 42 Patch 0     No current facility-administered medications for this visit.       Patient Active Problem List    Diagnosis Date Noted   • Pulmonary nodules 02/24/2021   • Cyst, dermoid, scalp and neck 02/24/2021   • Encounter for medical examination to establish care 07/24/2020   • Obesity (BMI 30-39.9) 07/24/2020   • Smoking 07/24/2020   • Cracked skin on feet 07/24/2020       Family History   Problem Relation Age of Onset   • No Known Problems Mother    • No Known Problems Father    • No Known Problems Sister    • No Known Problems Brother    • No Known Problems Sister        She  has a past medical history of Patient denies medical problems.  She  has a past surgical history that includes primary c section.       Objective:   Ht 1.676 m (5' 6\") Comment: Pt. stated  Wt 107 kg (235 lb) Comment: Pt. stated  BMI 37.93 kg/m²     Physical Exam:  Constitutional: Alert, no distress, well-groomed.  Skin: No rashes in visible areas.  Eye: Round. Conjunctiva clear, lids normal. No icterus.   ENMT: Lips pink without lesions, good dentition, moist mucous membranes. Phonation normal.  Neck: No masses, no thyromegaly. Moves freely without pain.  Respiratory: Unlabored respiratory effort, no cough or audible wheeze  Psych: Alert and oriented x3, normal affect and mood.       Assessment and Plan:   The following treatment plan was discussed:     1. Smoking  2. Pulmonary nodules    I congratulated her on this small victory.  Encouraged her to continue.  Also   we discussed the benefits of quitting including decrease risk of MI by 50% after one year of cessation, decreased risk of lung cancer after 12 years, one cigarette is enough to paralyze cilia for 24 hours leading to increase risk of pulmonary infection, etc....    I also encouraged her to follow-up with pulmonology as discussed.      Follow-up: Return in about 4 weeks (around 4/9/2021) for Reevaluation.         "

## 2021-03-29 ENCOUNTER — PATIENT MESSAGE (OUTPATIENT)
Dept: MEDICAL GROUP | Age: 41
End: 2021-03-29

## 2021-03-29 DIAGNOSIS — F17.200 SMOKING: ICD-10-CM

## 2021-03-31 RX ORDER — VARENICLINE TARTRATE 1 MG/1
1 TABLET, FILM COATED ORAL 2 TIMES DAILY
Qty: 60 TABLET | Refills: 3 | Status: SHIPPED | OUTPATIENT
Start: 2021-03-31 | End: 2022-01-03

## 2021-04-07 ENCOUNTER — HOSPITAL ENCOUNTER (OUTPATIENT)
Dept: RADIOLOGY | Facility: MEDICAL CENTER | Age: 41
End: 2021-04-07
Attending: FAMILY MEDICINE
Payer: COMMERCIAL

## 2021-04-07 ENCOUNTER — HOSPITAL ENCOUNTER (OUTPATIENT)
Dept: LAB | Facility: MEDICAL CENTER | Age: 41
End: 2021-04-07
Attending: FAMILY MEDICINE
Payer: COMMERCIAL

## 2021-04-07 DIAGNOSIS — Z00.00 ANNUAL PHYSICAL EXAM: ICD-10-CM

## 2021-04-07 DIAGNOSIS — D23.4 CYST, DERMOID, SCALP AND NECK: ICD-10-CM

## 2021-04-07 LAB
ALBUMIN SERPL BCP-MCNC: 4.2 G/DL (ref 3.2–4.9)
ALBUMIN/GLOB SERPL: 1.4 G/DL
ALP SERPL-CCNC: 101 U/L (ref 30–99)
ALT SERPL-CCNC: 25 U/L (ref 2–50)
ANION GAP SERPL CALC-SCNC: 6 MMOL/L (ref 7–16)
AST SERPL-CCNC: 25 U/L (ref 12–45)
BILIRUB SERPL-MCNC: 0.5 MG/DL (ref 0.1–1.5)
BUN SERPL-MCNC: 12 MG/DL (ref 8–22)
CALCIUM SERPL-MCNC: 9.2 MG/DL (ref 8.5–10.5)
CHLORIDE SERPL-SCNC: 103 MMOL/L (ref 96–112)
CHOLEST SERPL-MCNC: 196 MG/DL (ref 100–199)
CO2 SERPL-SCNC: 24 MMOL/L (ref 20–33)
CREAT SERPL-MCNC: 0.82 MG/DL (ref 0.5–1.4)
ERYTHROCYTE [DISTWIDTH] IN BLOOD BY AUTOMATED COUNT: 43.8 FL (ref 35.9–50)
FASTING STATUS PATIENT QL REPORTED: NORMAL
GLOBULIN SER CALC-MCNC: 3 G/DL (ref 1.9–3.5)
GLUCOSE SERPL-MCNC: 90 MG/DL (ref 65–99)
HCT VFR BLD AUTO: 45.7 % (ref 37–47)
HDLC SERPL-MCNC: 60 MG/DL
HGB BLD-MCNC: 15.1 G/DL (ref 12–16)
LDLC SERPL CALC-MCNC: 124 MG/DL
MCH RBC QN AUTO: 29.4 PG (ref 27–33)
MCHC RBC AUTO-ENTMCNC: 33 G/DL (ref 33.6–35)
MCV RBC AUTO: 89.1 FL (ref 81.4–97.8)
PLATELET # BLD AUTO: 235 K/UL (ref 164–446)
PMV BLD AUTO: 12.3 FL (ref 9–12.9)
POTASSIUM SERPL-SCNC: 4.4 MMOL/L (ref 3.6–5.5)
PROT SERPL-MCNC: 7.2 G/DL (ref 6–8.2)
RBC # BLD AUTO: 5.13 M/UL (ref 4.2–5.4)
SODIUM SERPL-SCNC: 133 MMOL/L (ref 135–145)
TRIGL SERPL-MCNC: 62 MG/DL (ref 0–149)
WBC # BLD AUTO: 8.1 K/UL (ref 4.8–10.8)

## 2021-04-07 PROCEDURE — 82306 VITAMIN D 25 HYDROXY: CPT

## 2021-04-07 PROCEDURE — 84443 ASSAY THYROID STIM HORMONE: CPT

## 2021-04-07 PROCEDURE — 85027 COMPLETE CBC AUTOMATED: CPT

## 2021-04-07 PROCEDURE — 84439 ASSAY OF FREE THYROXINE: CPT

## 2021-04-07 PROCEDURE — 84481 FREE ASSAY (FT-3): CPT

## 2021-04-07 PROCEDURE — 80061 LIPID PANEL: CPT

## 2021-04-07 PROCEDURE — 76536 US EXAM OF HEAD AND NECK: CPT

## 2021-04-07 PROCEDURE — 80053 COMPREHEN METABOLIC PANEL: CPT

## 2021-04-07 PROCEDURE — 36415 COLL VENOUS BLD VENIPUNCTURE: CPT

## 2021-04-08 LAB
T3FREE SERPL-MCNC: 3.38 PG/ML (ref 2–4.4)
T4 FREE SERPL-MCNC: 1.46 NG/DL (ref 0.93–1.7)
TSH SERPL DL<=0.005 MIU/L-ACNC: 0.78 UIU/ML (ref 0.38–5.33)

## 2021-04-09 LAB — 25(OH)D3 SERPL-MCNC: 21 NG/ML (ref 30–80)

## 2021-07-12 DIAGNOSIS — F17.200 SMOKING: ICD-10-CM

## 2021-07-12 RX ORDER — BUPROPION HYDROCHLORIDE 150 MG/1
TABLET ORAL
Qty: 180 TABLET | Refills: 0 | Status: SHIPPED | OUTPATIENT
Start: 2021-07-12 | End: 2021-10-09

## 2021-10-09 DIAGNOSIS — F17.200 SMOKING: ICD-10-CM

## 2021-10-09 RX ORDER — BUPROPION HYDROCHLORIDE 150 MG/1
TABLET ORAL
Qty: 60 TABLET | Refills: 0 | Status: SHIPPED | OUTPATIENT
Start: 2021-10-09 | End: 2021-11-02 | Stop reason: SDUPTHER

## 2021-11-02 DIAGNOSIS — F17.200 SMOKING: ICD-10-CM

## 2021-11-02 RX ORDER — BUPROPION HYDROCHLORIDE 150 MG/1
TABLET ORAL
Qty: 180 TABLET | Refills: 0 | Status: SHIPPED | OUTPATIENT
Start: 2021-11-02 | End: 2022-01-25

## 2021-11-02 NOTE — TELEPHONE ENCOUNTER
Received request via: Patient    Was the patient seen in the last year in this department? Yes    Does the patient have an active prescription (recently filled or refills available) for medication(s) requested? No Requesting 90 day supply

## 2022-01-03 ENCOUNTER — HOSPITAL ENCOUNTER (OUTPATIENT)
Facility: MEDICAL CENTER | Age: 42
End: 2022-01-03
Attending: PHYSICIAN ASSISTANT
Payer: COMMERCIAL

## 2022-01-03 ENCOUNTER — OFFICE VISIT (OUTPATIENT)
Dept: URGENT CARE | Facility: CLINIC | Age: 42
End: 2022-01-03
Payer: COMMERCIAL

## 2022-01-03 VITALS
WEIGHT: 250 LBS | OXYGEN SATURATION: 99 % | HEIGHT: 66 IN | HEART RATE: 129 BPM | DIASTOLIC BLOOD PRESSURE: 90 MMHG | RESPIRATION RATE: 20 BRPM | TEMPERATURE: 100.2 F | SYSTOLIC BLOOD PRESSURE: 128 MMHG | BODY MASS INDEX: 40.18 KG/M2

## 2022-01-03 DIAGNOSIS — R52 BODY ACHES: ICD-10-CM

## 2022-01-03 DIAGNOSIS — R50.9 FEVER, UNSPECIFIED FEVER CAUSE: ICD-10-CM

## 2022-01-03 PROCEDURE — 0240U HCHG SARS-COV-2 COVID-19 NFCT DS RESP RNA 3 TRGT MIC: CPT

## 2022-01-03 PROCEDURE — 99213 OFFICE O/P EST LOW 20 MIN: CPT | Performed by: PHYSICIAN ASSISTANT

## 2022-01-03 ASSESSMENT — ENCOUNTER SYMPTOMS
CHILLS: 1
FEVER: 1
MYALGIAS: 1
COUGH: 1

## 2022-01-03 ASSESSMENT — FIBROSIS 4 INDEX: FIB4 SCORE: 0.85

## 2022-01-03 NOTE — PROGRESS NOTES
"If  Subjective:   Marissa Molina is a 41 y.o. female who presents today with   Chief Complaint   Patient presents with   • Fever     this morning   • Cough   • Chills       Fever   This is a new problem. The current episode started today. The problem occurs constantly. The problem has been unchanged. Associated symptoms include coughing. Associated symptoms comments: Chills. Treatments tried: Theraflu. The treatment provided no relief.     I personally donned proper PPE throughout visit today.   Patient has had her Covid vaccines.  PMH:  has a past medical history of Patient denies medical problems.  MEDS:   Current Outpatient Medications:   •  buPROPion (WELLBUTRIN XL) 150 MG XL tablet, TAKE 1 TABLET BY MOUTH TWICE A DAY, Disp: 180 Tablet, Rfl: 0  ALLERGIES:   Allergies   Allergen Reactions   • Penicillins Hives     SURGHX:   Past Surgical History:   Procedure Laterality Date   • PRIMARY C SECTION       SOCHX:  reports that she has quit smoking. She has a 15.00 pack-year smoking history. She has never used smokeless tobacco. She reports current alcohol use. She reports that she does not use drugs.  FH: Reviewed with patient, not pertinent to this visit.     Review of Systems   Constitutional: Positive for chills, fever and malaise/fatigue.   Respiratory: Positive for cough.    Musculoskeletal: Positive for myalgias.      Objective:   /90   Pulse (!) 129   Temp 37.9 °C (100.2 °F) (Oral)   Resp 20   Ht 1.676 m (5' 6\")   Wt 113 kg (250 lb)   SpO2 99%   BMI 40.35 kg/m²   Physical Exam  Vitals and nursing note reviewed.   Constitutional:       General: She is not in acute distress.     Appearance: Normal appearance. She is well-developed. She is not ill-appearing or toxic-appearing.   HENT:      Head: Normocephalic and atraumatic.      Right Ear: Hearing normal.      Left Ear: Hearing normal.   Eyes:      Conjunctiva/sclera: Conjunctivae normal.   Cardiovascular:      Rate and Rhythm: Normal rate and regular " rhythm.      Heart sounds: Normal heart sounds.   Pulmonary:      Effort: Pulmonary effort is normal. No respiratory distress.      Breath sounds: Normal breath sounds. No stridor. No wheezing, rhonchi or rales.   Musculoskeletal:      Comments: Normal movement in all 4 extremities   Skin:     General: Skin is warm and dry.   Neurological:      Mental Status: She is alert.      Coordination: Coordination normal.   Psychiatric:         Mood and Affect: Mood normal.         Assessment/Plan:   Assessment    1. Fever, unspecified fever cause  - CoV-2 and Flu A/B by PCR (24 hour In-House): Collect NP swab in VTM; Future    2. Body aches  - CoV-2 and Flu A/B by PCR (24 hour In-House): Collect NP swab in VTM; Future  Symptoms and presentation are consistent with viral illness.  Unfortunately today we do not have rapid Covid or flu but will send off for both and if flu comes back positive in 24 hours we will prescribe Tamiflu at that time.  Vital signs are stable on exam today.  Did recommend repeat testing if possible false negative given early testing and she is understanding of this and will be retested in 2 or 3 days if needed.  Discussed CDC guidelines including self isolation at home.   Patient encouraged to get plenty of rest, use OTC tylenol for pain/fever, and drink plenty of fluids.    Differential diagnosis, natural history, supportive care, and indications for immediate follow-up discussed.   Patient given instructions and understanding of medications and treatment.    If not improving in 3-5 days, F/U with PCP or return to  if symptoms worsen.    Patient agreeable to plan.      Please note that this dictation was created using voice recognition software. I have made every reasonable attempt to correct obvious errors, but I expect that there are errors of grammar and possibly content that I did not discover before finalizing the note.    Ashwin Licea PA-C

## 2022-01-04 LAB
FLUAV RNA SPEC QL NAA+PROBE: NEGATIVE
FLUBV RNA SPEC QL NAA+PROBE: NEGATIVE
SARS-COV-2 RNA RESP QL NAA+PROBE: DETECTED
SPECIMEN SOURCE: ABNORMAL

## 2022-01-25 DIAGNOSIS — F17.200 SMOKING: ICD-10-CM

## 2022-01-25 RX ORDER — BUPROPION HYDROCHLORIDE 150 MG/1
TABLET ORAL
Qty: 180 TABLET | Refills: 0 | Status: SHIPPED | OUTPATIENT
Start: 2022-01-25 | End: 2022-04-26 | Stop reason: SDUPTHER

## 2022-04-26 DIAGNOSIS — F17.200 SMOKING: ICD-10-CM

## 2022-04-27 RX ORDER — BUPROPION HYDROCHLORIDE 150 MG/1
150 TABLET ORAL 2 TIMES DAILY
Qty: 180 TABLET | Refills: 0 | Status: SHIPPED | OUTPATIENT
Start: 2022-04-27 | End: 2022-07-26

## 2022-06-17 ENCOUNTER — APPOINTMENT (OUTPATIENT)
Dept: URGENT CARE | Facility: CLINIC | Age: 42
End: 2022-06-17
Payer: COMMERCIAL

## 2022-07-26 DIAGNOSIS — F17.200 SMOKING: ICD-10-CM

## 2022-07-26 RX ORDER — BUPROPION HYDROCHLORIDE 150 MG/1
TABLET ORAL
Qty: 180 TABLET | Refills: 0 | Status: SHIPPED | OUTPATIENT
Start: 2022-07-26 | End: 2022-11-02

## 2022-11-02 DIAGNOSIS — F17.200 SMOKING: ICD-10-CM

## 2022-11-02 RX ORDER — BUPROPION HYDROCHLORIDE 150 MG/1
TABLET ORAL
Qty: 180 TABLET | Refills: 0 | Status: SHIPPED | OUTPATIENT
Start: 2022-11-02 | End: 2023-01-31

## 2023-01-30 DIAGNOSIS — F17.200 SMOKING: ICD-10-CM

## 2023-01-31 RX ORDER — BUPROPION HYDROCHLORIDE 150 MG/1
TABLET ORAL
Qty: 180 TABLET | Refills: 0 | Status: SHIPPED | OUTPATIENT
Start: 2023-01-31 | End: 2023-05-09

## 2023-02-23 ENCOUNTER — OFFICE VISIT (OUTPATIENT)
Dept: MEDICAL GROUP | Facility: MEDICAL CENTER | Age: 43
End: 2023-02-23
Payer: COMMERCIAL

## 2023-02-23 VITALS
OXYGEN SATURATION: 96 % | SYSTOLIC BLOOD PRESSURE: 122 MMHG | BODY MASS INDEX: 40.6 KG/M2 | TEMPERATURE: 98.5 F | DIASTOLIC BLOOD PRESSURE: 70 MMHG | HEIGHT: 66 IN | WEIGHT: 252.65 LBS | HEART RATE: 97 BPM | RESPIRATION RATE: 16 BRPM

## 2023-02-23 DIAGNOSIS — G56.03 BILATERAL CARPAL TUNNEL SYNDROME: ICD-10-CM

## 2023-02-23 DIAGNOSIS — R20.0 NUMBNESS AND TINGLING IN BOTH HANDS: ICD-10-CM

## 2023-02-23 DIAGNOSIS — Z00.00 HEALTHCARE MAINTENANCE: ICD-10-CM

## 2023-02-23 DIAGNOSIS — Z11.59 NEED FOR HEPATITIS C SCREENING TEST: ICD-10-CM

## 2023-02-23 DIAGNOSIS — R20.2 NUMBNESS AND TINGLING IN BOTH HANDS: ICD-10-CM

## 2023-02-23 DIAGNOSIS — Z23 NEED FOR VACCINATION: ICD-10-CM

## 2023-02-23 DIAGNOSIS — Z79.899 LONG-TERM CURRENT USE OF ANTIDEPRESSANT: ICD-10-CM

## 2023-02-23 PROCEDURE — 90686 IIV4 VACC NO PRSV 0.5 ML IM: CPT | Performed by: STUDENT IN AN ORGANIZED HEALTH CARE EDUCATION/TRAINING PROGRAM

## 2023-02-23 PROCEDURE — 99214 OFFICE O/P EST MOD 30 MIN: CPT | Mod: 25 | Performed by: STUDENT IN AN ORGANIZED HEALTH CARE EDUCATION/TRAINING PROGRAM

## 2023-02-23 PROCEDURE — 90471 IMMUNIZATION ADMIN: CPT | Performed by: STUDENT IN AN ORGANIZED HEALTH CARE EDUCATION/TRAINING PROGRAM

## 2023-02-23 ASSESSMENT — PATIENT HEALTH QUESTIONNAIRE - PHQ9: CLINICAL INTERPRETATION OF PHQ2 SCORE: 0

## 2023-02-23 ASSESSMENT — ENCOUNTER SYMPTOMS
VOMITING: 0
CHILLS: 0
FOCAL WEAKNESS: 0
NAUSEA: 0
ABDOMINAL PAIN: 0
FEVER: 0
COUGH: 0
TINGLING: 1
SHORTNESS OF BREATH: 0

## 2023-02-23 ASSESSMENT — FIBROSIS 4 INDEX: FIB4 SCORE: 0.87

## 2023-02-23 NOTE — PROGRESS NOTES
"Subjective:     CC: establish care    HPI:   Marissa presents today with    Problem   Numbness and Tingling in Both Hands    For last 9 months. Works at office, types a lot. Using braces at night, sometimes it helps.     Bilateral Carpal Tunnel Syndrome    Using wrist splints at night for 5 months, not effective     Long-Term Current Use of Antidepressant    Patient is on bupropion 300 mg for smoking cessation. Patient successfully quit smoking last year. We will taper off bupropion. Patient agreed.         Health Maintenance: Completed    ROS:  Review of Systems   Constitutional:  Negative for chills and fever.   Respiratory:  Negative for cough and shortness of breath.    Cardiovascular:  Negative for chest pain and leg swelling.   Gastrointestinal:  Negative for abdominal pain, nausea and vomiting.   Skin:  Negative for rash.   Neurological:  Positive for tingling. Negative for focal weakness.     Objective:     Exam:  /70 (BP Location: Right arm, Patient Position: Sitting, BP Cuff Size: Adult long)   Pulse 97   Temp 36.9 °C (98.5 °F) (Temporal)   Resp 16   Ht 1.676 m (5' 6\")   Wt 115 kg (252 lb 10.4 oz)   LMP 02/17/2023   SpO2 96%   BMI 40.78 kg/m²  Body mass index is 40.78 kg/m².    Physical Exam  Vitals reviewed.   HENT:      Head: Normocephalic.      Mouth/Throat:      Mouth: Mucous membranes are moist.      Pharynx: Oropharynx is clear.   Eyes:      Pupils: Pupils are equal, round, and reactive to light.   Cardiovascular:      Rate and Rhythm: Normal rate and regular rhythm.   Pulmonary:      Effort: Pulmonary effort is normal. No respiratory distress.      Breath sounds: No wheezing or rales.   Abdominal:      General: Abdomen is flat. Bowel sounds are normal. There is no distension.      Tenderness: There is no abdominal tenderness. There is no guarding.   Skin:     General: Skin is warm.   Neurological:      General: No focal deficit present.      Mental Status: She is alert and oriented to " person, place, and time.      Comments: Phalen test positive on b/l hands     Labs: fantasma    Assessment & Plan:     42 y.o. female with the following -     1. Need for vaccination  - INFLUENZA VACCINE QUAD INJ (PF)    2. Healthcare maintenance  Recommended to get bivalent covid booster at a pharmacy.  Patient wants to think about hep B vaccine.  Last PAP smear was done 8 years ago, was normal.  - Referral to Gynecology  - CBC WITH DIFFERENTIAL; Future  - Comp Metabolic Panel; Future  - Lipid Profile; Future  - HEMOGLOBIN A1C; Future  - VITAMIN D,25 HYDROXY (DEFICIENCY); Future    3. Need for hepatitis C screening test  - HCV Scrn ( [id number] 1xLife); Future    4. Numbness and tingling in both hands  5. Bilateral carpal tunnel syndrome  Positive Phalen test b/l. Wrist splints not effective for 5 months.   - Referral to Physiatry (PMR)    6. Long-term current use of antidepressant  Patient was on 300 mg bupropion for smoking cessation. We will taper down and discontinue it. Patient agreed.      I spent a total of 36 minutes with record review, exam, communication with the patient, communication with other providers, and documentation of this encounter.      Return in about 1 month (around 3/23/2023) for F/u labs.    Please note that this dictation was created using voice recognition software. I have made every reasonable attempt to correct obvious errors, but I expect that there are errors of grammar and possibly content that I did not discover before finalizing the note.

## 2023-03-10 ENCOUNTER — HOSPITAL ENCOUNTER (OUTPATIENT)
Dept: LAB | Facility: MEDICAL CENTER | Age: 43
End: 2023-03-10
Attending: STUDENT IN AN ORGANIZED HEALTH CARE EDUCATION/TRAINING PROGRAM
Payer: COMMERCIAL

## 2023-03-10 DIAGNOSIS — Z11.59 NEED FOR HEPATITIS C SCREENING TEST: ICD-10-CM

## 2023-03-10 DIAGNOSIS — Z00.00 HEALTHCARE MAINTENANCE: ICD-10-CM

## 2023-03-10 LAB
25(OH)D3 SERPL-MCNC: 24 NG/ML (ref 30–100)
ALBUMIN SERPL BCP-MCNC: 4.5 G/DL (ref 3.2–4.9)
ALBUMIN/GLOB SERPL: 1.4 G/DL
ALP SERPL-CCNC: 75 U/L (ref 30–99)
ALT SERPL-CCNC: 29 U/L (ref 2–50)
ANION GAP SERPL CALC-SCNC: 16 MMOL/L (ref 7–16)
AST SERPL-CCNC: 22 U/L (ref 12–45)
BASOPHILS # BLD AUTO: 0.7 % (ref 0–1.8)
BASOPHILS # BLD: 0.05 K/UL (ref 0–0.12)
BILIRUB SERPL-MCNC: 0.4 MG/DL (ref 0.1–1.5)
BUN SERPL-MCNC: 12 MG/DL (ref 8–22)
CALCIUM ALBUM COR SERPL-MCNC: 9 MG/DL (ref 8.5–10.5)
CALCIUM SERPL-MCNC: 9.4 MG/DL (ref 8.5–10.5)
CHLORIDE SERPL-SCNC: 103 MMOL/L (ref 96–112)
CHOLEST SERPL-MCNC: 202 MG/DL (ref 100–199)
CO2 SERPL-SCNC: 17 MMOL/L (ref 20–33)
CREAT SERPL-MCNC: 0.91 MG/DL (ref 0.5–1.4)
EOSINOPHIL # BLD AUTO: 0.21 K/UL (ref 0–0.51)
EOSINOPHIL NFR BLD: 2.8 % (ref 0–6.9)
ERYTHROCYTE [DISTWIDTH] IN BLOOD BY AUTOMATED COUNT: 42 FL (ref 35.9–50)
EST. AVERAGE GLUCOSE BLD GHB EST-MCNC: 105 MG/DL
FASTING STATUS PATIENT QL REPORTED: NORMAL
GFR SERPLBLD CREATININE-BSD FMLA CKD-EPI: 81 ML/MIN/1.73 M 2
GLOBULIN SER CALC-MCNC: 3.2 G/DL (ref 1.9–3.5)
GLUCOSE SERPL-MCNC: 85 MG/DL (ref 65–99)
HBA1C MFR BLD: 5.3 % (ref 4–5.6)
HCT VFR BLD AUTO: 44.5 % (ref 37–47)
HCV AB SER QL: NORMAL
HDLC SERPL-MCNC: 58 MG/DL
HGB BLD-MCNC: 14.7 G/DL (ref 12–16)
IMM GRANULOCYTES # BLD AUTO: 0.03 K/UL (ref 0–0.11)
IMM GRANULOCYTES NFR BLD AUTO: 0.4 % (ref 0–0.9)
LDLC SERPL CALC-MCNC: 130 MG/DL
LYMPHOCYTES # BLD AUTO: 2.84 K/UL (ref 1–4.8)
LYMPHOCYTES NFR BLD: 37.8 % (ref 22–41)
MCH RBC QN AUTO: 28.4 PG (ref 27–33)
MCHC RBC AUTO-ENTMCNC: 33 G/DL (ref 33.6–35)
MCV RBC AUTO: 85.9 FL (ref 81.4–97.8)
MONOCYTES # BLD AUTO: 0.49 K/UL (ref 0–0.85)
MONOCYTES NFR BLD AUTO: 6.5 % (ref 0–13.4)
NEUTROPHILS # BLD AUTO: 3.9 K/UL (ref 2–7.15)
NEUTROPHILS NFR BLD: 51.8 % (ref 44–72)
NRBC # BLD AUTO: 0 K/UL
NRBC BLD-RTO: 0 /100 WBC
PLATELET # BLD AUTO: 228 K/UL (ref 164–446)
PMV BLD AUTO: 12.1 FL (ref 9–12.9)
POTASSIUM SERPL-SCNC: 4.1 MMOL/L (ref 3.6–5.5)
PROT SERPL-MCNC: 7.7 G/DL (ref 6–8.2)
RBC # BLD AUTO: 5.18 M/UL (ref 4.2–5.4)
SODIUM SERPL-SCNC: 136 MMOL/L (ref 135–145)
TRIGL SERPL-MCNC: 71 MG/DL (ref 0–149)
WBC # BLD AUTO: 7.5 K/UL (ref 4.8–10.8)

## 2023-03-10 PROCEDURE — 80061 LIPID PANEL: CPT

## 2023-03-10 PROCEDURE — 83036 HEMOGLOBIN GLYCOSYLATED A1C: CPT

## 2023-03-10 PROCEDURE — 80053 COMPREHEN METABOLIC PANEL: CPT

## 2023-03-10 PROCEDURE — 36415 COLL VENOUS BLD VENIPUNCTURE: CPT

## 2023-03-10 PROCEDURE — 86803 HEPATITIS C AB TEST: CPT

## 2023-03-10 PROCEDURE — 82306 VITAMIN D 25 HYDROXY: CPT

## 2023-03-10 PROCEDURE — 85025 COMPLETE CBC W/AUTO DIFF WBC: CPT

## 2023-04-27 ENCOUNTER — OFFICE VISIT (OUTPATIENT)
Dept: PHYSICAL MEDICINE AND REHAB | Facility: MEDICAL CENTER | Age: 43
End: 2023-04-27
Payer: COMMERCIAL

## 2023-04-27 VITALS
WEIGHT: 254.41 LBS | OXYGEN SATURATION: 95 % | HEART RATE: 78 BPM | DIASTOLIC BLOOD PRESSURE: 78 MMHG | SYSTOLIC BLOOD PRESSURE: 126 MMHG | HEIGHT: 66 IN | TEMPERATURE: 97.4 F | BODY MASS INDEX: 40.89 KG/M2

## 2023-04-27 DIAGNOSIS — Z74.09 IMPAIRED MOBILITY AND ADLS: ICD-10-CM

## 2023-04-27 DIAGNOSIS — M79.2 NEURALGIA: ICD-10-CM

## 2023-04-27 DIAGNOSIS — G56.03 BILATERAL CARPAL TUNNEL SYNDROME: ICD-10-CM

## 2023-04-27 DIAGNOSIS — Z78.9 IMPAIRED MOBILITY AND ADLS: ICD-10-CM

## 2023-04-27 DIAGNOSIS — Z74.09 IMPAIRED PHYSICAL MOBILITY: ICD-10-CM

## 2023-04-27 PROCEDURE — 99204 OFFICE O/P NEW MOD 45 MIN: CPT | Performed by: PHYSICAL MEDICINE & REHABILITATION

## 2023-04-27 PROCEDURE — 76882 US LMTD JT/FCL EVL NVASC XTR: CPT | Performed by: PHYSICAL MEDICINE & REHABILITATION

## 2023-04-27 ASSESSMENT — PAIN SCALES - GENERAL: PAINLEVEL: 2=MINIMAL-SLIGHT

## 2023-04-27 ASSESSMENT — FIBROSIS 4 INDEX: FIB4 SCORE: 0.73

## 2023-04-27 ASSESSMENT — PATIENT HEALTH QUESTIONNAIRE - PHQ9: CLINICAL INTERPRETATION OF PHQ2 SCORE: 0

## 2023-04-27 NOTE — PROGRESS NOTES
New patient note    Interventional spine and Pain  Physiatry (physical medicine and  Rehabilitation)     Date of service: See epic    Chief complaint:   Chief Complaint   Patient presents with    Extremity Weakness     Bilateral Carpal Tunnel        Referring provider: Warner Rogers M.D.     HISTORY    HPI: Marissa Molina 42 y.o.  who presents today with Diagnoses of Neuralgia, Bilateral carpal tunnel syndrome, Impaired mobility and ADLs, and Impaired physical mobility were pertinent to this visit.    Right hand numbness tingling burning sensation.  This is palmar aspect of bilateral hands mostly the first 3 digits.  This does wake the patient up at night.  Flick sign is positive.  This been present for at least the past 9 months.  This progressively worsened.  She is tried wearing neutral wrist splint for the past several months with minimal improvement in pain.  Positive for dropping objects with both hands.  The patient works as an .  He has tried acetaminophen and NSAIDs with no improvement.       Medical records review:  I reviewed the note from the referring provider Warner Rogers M.D. including the note dated 2/23/2023.           ROS:   Red Flags ROS:   Fever, Chills, Sweats: Denies  Involuntary Weight Loss: Denies  Bladder Incontinence: Denies  Bowel Incontinence: denies  Saddle Anesthesia: Denies    All other systems reviewed and negative.       PMHx:   Past Medical History:   Diagnosis Date    Patient denies medical problems          Current Outpatient Medications on File Prior to Visit   Medication Sig Dispense Refill    buPROPion (WELLBUTRIN XL) 150 MG XL tablet TAKE 1 TABLET BY MOUTH TWICE A  Tablet 0     No current facility-administered medications on file prior to visit.        PSHx:   Past Surgical History:   Procedure Laterality Date    PRIMARY C SECTION         Family history   Family History   Problem Relation Age of Onset    No Known Problems Mother     No  "Known Problems Father     No Known Problems Sister     No Known Problems Sister     No Known Problems Brother     Ovarian Cancer Neg Hx     Tubal Cancer Neg Hx     Peritoneal Cancer Neg Hx     Colorectal Cancer Neg Hx     Breast Cancer Neg Hx     Cancer Neg Hx     Diabetes Neg Hx     Heart Disease Neg Hx     Hypertension Neg Hx     Hyperlipidemia Neg Hx     Stroke Neg Hx          Medications: reviewed on epic.   No outpatient medications have been marked as taking for the 23 encounter (Office Visit) with Omero Kaufman M.D..        Allergies:   Allergies   Allergen Reactions    Penicillins Hives       Social Hx:   Social History     Socioeconomic History    Marital status:      Spouse name: Not on file    Number of children: Not on file    Years of education: Not on file    Highest education level: Associate degree: academic program   Occupational History    Not on file   Tobacco Use    Smoking status: Former     Packs/day: 0.75     Years: 20.00     Pack years: 15.00     Types: Cigarettes     Quit date:      Years since quittin.3    Smokeless tobacco: Never   Vaping Use    Vaping Use: Never used   Substance and Sexual Activity    Alcohol use: Yes     Comment: occ     Drug use: Never    Sexual activity: Not Currently   Other Topics Concern    Not on file   Social History Narrative    Not on file     Social Determinants of Health     Financial Resource Strain: Not on file   Food Insecurity: Not on file   Transportation Needs: Not on file   Physical Activity: Not on file   Stress: Not on file   Social Connections: Not on file   Intimate Partner Violence: Not on file   Housing Stability: Not on file         EXAMINATION     Physical Exam:   Vitals: /78   Pulse 78   Temp 36.3 °C (97.4 °F)   Ht 1.676 m (5' 6\")   Wt 115 kg (254 lb 6.6 oz)   SpO2 95%     Constitutional:   Body Habitus: Body mass index is 41.06 kg/m².  Cooperation: Fully cooperates with exam  Appearance: Well-groomed, " well-nourished, not disheveled     Eyes: No scleral icterus to suggest severe liver disease, no proptosis to suggest severe hyperthyroid    ENT -no obvious auditory deficits, no obvious tongue lesions, tongue midline, no facial droop     Skin -no rashes or lesions noted     Respiratory-  breathing comfortable on room air, no audible wheezing    Cardiovascular- capillary refills less than 2 seconds.     Psychiatric- alert and oriented ×3. Normal affect.       Musculoskeletal and Neuro -     Bilateral hands:   Palpation no significant tenderness to palpation throughout the bilateral hands  Range of motion is within normal limits throughout bilateral hands, fingers and wrist.  Special tests:  Tinel's at the wrist over the median nerve positive bilaterally  Carpal tunnel compression: positive bilaterally  Phalen's test: positive bilaterally  Finkelstein's test: negative bilaterally  CMC grind test negative bilaterally       Cervical spine   Inspection: No deformities of the skin over the cervical spine. No rashes or lesions.    full   active range of motion in all directions, without  pain      Spurling’s sign: negative bilaterally    No signs of muscular atrophy in bilateral upper extremities       Key points for the international standards for neurological classification of spinal cord injury (ISNCSCI) to light touch.     Dermatome R L   C4 2 2   C5 2 2   C6 2 2   C7 2 2   C8 2 2   T1 2 2   T2 2 2                                     Motor Exam Upper Extremities   ? Myotome R L   Shoulder flexion C5 5 5   Elbow flexion C5 5 5   Wrist extension C6 5 5   Elbow extension C7 5 5   Finger flexion C8 5 5   Finger abduction T1 5 5     Reflexes  ?  R L   Biceps  2+ 2+   Brachioradialis  2+ 2+     Henderson’s sign negative bilaterally                MEDICAL DECISION MAKING    Medical records review: see under HPI section.     DATA    Labs:   Lab Results   Component Value Date/Time    SODIUM 136 03/10/2023 03:33 PM    POTASSIUM  4.1 03/10/2023 03:33 PM    CHLORIDE 103 03/10/2023 03:33 PM    CO2 17 (L) 03/10/2023 03:33 PM    ANION 16.0 03/10/2023 03:33 PM    GLUCOSE 85 03/10/2023 03:33 PM    BUN 12 03/10/2023 03:33 PM    CREATININE 0.91 03/10/2023 03:33 PM    CALCIUM 9.4 03/10/2023 03:33 PM    ASTSGOT 22 03/10/2023 03:33 PM    ALTSGPT 29 03/10/2023 03:33 PM    TBILIRUBIN 0.4 03/10/2023 03:33 PM    ALBUMIN 4.5 03/10/2023 03:33 PM    TOTPROTEIN 7.7 03/10/2023 03:33 PM    GLOBULIN 3.2 03/10/2023 03:33 PM    AGRATIO 1.4 03/10/2023 03:33 PM   ]    No results found for: PROTHROMBTM, INR     Lab Results   Component Value Date/Time    WBC 7.5 03/10/2023 03:33 PM    RBC 5.18 03/10/2023 03:33 PM    HEMOGLOBIN 14.7 03/10/2023 03:33 PM    HEMATOCRIT 44.5 03/10/2023 03:33 PM    MCV 85.9 03/10/2023 03:33 PM    MCH 28.4 03/10/2023 03:33 PM    MCHC 33.0 (L) 03/10/2023 03:33 PM    MPV 12.1 03/10/2023 03:33 PM    NEUTSPOLYS 51.80 03/10/2023 03:33 PM    LYMPHOCYTES 37.80 03/10/2023 03:33 PM    MONOCYTES 6.50 03/10/2023 03:33 PM    EOSINOPHILS 2.80 03/10/2023 03:33 PM    BASOPHILS 0.70 03/10/2023 03:33 PM        Lab Results   Component Value Date/Time    HBA1C 5.3 03/10/2023 03:33 PM        Imaging:   I personally reviewed following images, these are my reads          IMAGING radiology reads. I reviewed the following radiology reads                                                                    Results for orders placed in visit on 02/18/21    DX-CHEST-2 VIEWS    Impression  1.  No focal infiltrates.  2.  Perihilar interstitial prominence and bronchial wall cuffing, appearance suggests changes of underlying bronchial inflammation, consider bronchitis.                                             Diagnosis   Visit Diagnoses     ICD-10-CM   1. Neuralgia  M79.2   2. Bilateral carpal tunnel syndrome  G56.03   3. Impaired mobility and ADLs  Z74.09    Z78.9   4. Impaired physical mobility  Z74.09           ASSESSMENT AND PLAN:  Marissa Molina 42 y.o. female       Marissa was seen today for extremity weakness.    Diagnoses and all orders for this visit:    Neuralgia  -     Referral to Neurodiagnostics (EEG,EP,EMG/NCS/DBS)    Bilateral carpal tunnel syndrome  -     Referral to Neurodiagnostics (EEG,EP,EMG/NCS/DBS)  -     Referral to Hand Surgery  -     Cancel: Referral to Pain Clinic  -     Referral to Pain Clinic    Impaired mobility and ADLs  -     Referral to Neurodiagnostics (EEG,EP,EMG/NCS/DBS)    Impaired physical mobility  -     Referral to Neurodiagnostics (EEG,EP,EMG/NCS/DBS)          Neuralgia is likely secondary to bilateral carpal tunnel syndrome right versus left which is consistent with exam and consistent with diagnostic ultrasound.  I ordered an EMG for further evaluation.    We discussed third goal consultation the patient ultimately does want to have surgery however she is playing in a football league and would like any treatments that can alleviate symptoms for the time being as well.    home exercise program: We discussed home exercise program and stretching routine    Diagnostic workup:   4/27/2023 I performed a limited diagnostic ultrasound for the patient's neuralgia ICD M79.2. I performed a limited diagnostic ultrasound of the RIGHT wrist including the median nerve to evaluate the cause of the patient's neuralgia. The median nerve which shows a cross-sectional area of 24 mm² at the level of the carpal tunnel outlet and 10 mm² at the level of the pronator quadratus. This is consistent with carpal tunnel syndrome.  The images were uploaded to the medical record.      4/27/2023 I performed a limited diagnostic ultrasound for the patient's neuralgia ICD M79.2. I performed a limited diagnostic ultrasound of the LEFT wrist including the median nerve to evaluate the cause of the patient's neuralgia. The median nerve which shows a cross-sectional area of 16 mm² at the level of the carpal tunnel outlet and 7 mm² at the level of the pronator quadratus. This is  consistent with carpal tunnel syndrome.  The images were uploaded to the medical record.      Medications:   As above     Interventional program:   We discussed options.  The patient plays in the [employer] football league and wants to avoid surgery until after July.  We discussed carpal tunnel injection the patient is amenable to this.    I placed an order for right versus left carpal tunnel injection to be done at the next visit.    Referrals: Hand surgery for consultation        Follow-up: For injection at the next visit.  Also to review EMG when this is completed.          Please note that this dictation was created using voice recognition software. I have made every reasonable attempt to correct obvious errors but there may be errors of grammar and content that I may have overlooked prior to finalization of this note.      Omero Kaufman MD  Physical Medicine and Rehabilitation  Interventional Spine and Sports Physiatry  [facility]          Warner Rogers M.D.

## 2023-05-03 ENCOUNTER — OFFICE VISIT (OUTPATIENT)
Dept: PHYSICAL MEDICINE AND REHAB | Facility: MEDICAL CENTER | Age: 43
End: 2023-05-03
Payer: COMMERCIAL

## 2023-05-03 VITALS
TEMPERATURE: 98.9 F | BODY MASS INDEX: 41.31 KG/M2 | HEIGHT: 66 IN | HEART RATE: 86 BPM | SYSTOLIC BLOOD PRESSURE: 128 MMHG | OXYGEN SATURATION: 96 % | DIASTOLIC BLOOD PRESSURE: 90 MMHG | WEIGHT: 257.06 LBS

## 2023-05-03 DIAGNOSIS — G56.03 BILATERAL CARPAL TUNNEL SYNDROME: ICD-10-CM

## 2023-05-03 PROCEDURE — 20526 THER INJECTION CARP TUNNEL: CPT | Mod: RT | Performed by: PHYSICAL MEDICINE & REHABILITATION

## 2023-05-03 PROCEDURE — 76942 ECHO GUIDE FOR BIOPSY: CPT | Performed by: PHYSICAL MEDICINE & REHABILITATION

## 2023-05-03 RX ORDER — DEXAMETHASONE SODIUM PHOSPHATE 4 MG/ML
4 INJECTION, SOLUTION INTRA-ARTICULAR; INTRALESIONAL; INTRAMUSCULAR; INTRAVENOUS; SOFT TISSUE ONCE
Status: COMPLETED | OUTPATIENT
Start: 2023-05-03 | End: 2023-05-03

## 2023-05-03 RX ADMIN — DEXAMETHASONE SODIUM PHOSPHATE 4 MG: 4 INJECTION, SOLUTION INTRA-ARTICULAR; INTRALESIONAL; INTRAMUSCULAR; INTRAVENOUS; SOFT TISSUE at 16:44

## 2023-05-03 ASSESSMENT — FIBROSIS 4 INDEX: FIB4 SCORE: 0.73

## 2023-05-03 ASSESSMENT — PATIENT HEALTH QUESTIONNAIRE - PHQ9: CLINICAL INTERPRETATION OF PHQ2 SCORE: 0

## 2023-05-03 ASSESSMENT — PAIN SCALES - GENERAL: PAINLEVEL: 4=SLIGHT-MODERATE PAIN

## 2023-05-03 NOTE — PROCEDURES
Date of Service: 5/3/2023    Patient: Marissa Molina 42 y.o. female MRN: [id number]     Physician/s: Omero Kaufman MD    Pre-operative Diagnosis: RIGHT  carpal tunnel syndrome    Post-operative Diagnosis: RIGHT  carpal tunnel syndrome      Procedure: RIGHT  ultrasound-guided carpal tunnel injection    Description of procedure:    The risks, benefits, and alternatives of the procedure were reviewed and discussed with the patient.  Written informed consent was freely obtained. A pre-procedural time-out was conducted by the physician verifying patient’s identity, procedure to be performed, procedure site and side, and allergy verification. Appropriate equipment was determined to be in place for the procedure.     No sedation was used for this procedure.     A solution was prepared with 1 mL of 4 mg/mL of dexamethasone and 2 mL of 1% lidocaine.      In the office suite the patient was placed in a sitting position, and her RIGHT   hand/s were rested with the palm/s up on a sterile thapa stand, and the skin was prepped and draped in the usual sterile fashion. Median nerve at the carpal tunnel was identified under ultrasound guidance. Also identified where the ulnar nerve, ulnar artery, radial artery to confirm the needle path would not be impacting the structures. Under ultrasound guidance with an in plane approach,  A 27g 1.5 inch needle was placed into skin and advanced adjacent so the needle path was deep to the median nerve. Following negative aspiration, a total of 1.5mL solution mixture was injected perineurally. The patient's skin was wiped with a 4x4 gauze, the area was cleansed with alcohol prep, and a bandaid was applied. There were no complications noted.     The images were uploaded to our secure system for permanent storage.         Omero Kaufman MD  Interventional Spine and Pain  Physical Medicine and Rehabilitation  [facility]

## 2023-05-09 DIAGNOSIS — F17.200 SMOKING: ICD-10-CM

## 2023-05-09 RX ORDER — BUPROPION HYDROCHLORIDE 100 MG/1
100 TABLET ORAL 2 TIMES DAILY
Qty: 60 TABLET | Refills: 0 | Status: SHIPPED | OUTPATIENT
Start: 2023-05-09 | End: 2023-06-08

## 2023-06-01 ENCOUNTER — APPOINTMENT (OUTPATIENT)
Dept: PHYSICAL MEDICINE AND REHAB | Facility: MEDICAL CENTER | Age: 43
End: 2023-06-01
Payer: COMMERCIAL

## 2023-09-07 ENCOUNTER — APPOINTMENT (OUTPATIENT)
Dept: MEDICAL GROUP | Facility: MEDICAL CENTER | Age: 43
End: 2023-09-07
Payer: COMMERCIAL

## 2023-09-12 ENCOUNTER — NON-PROVIDER VISIT (OUTPATIENT)
Dept: NEUROLOGY | Facility: MEDICAL CENTER | Age: 43
End: 2023-09-12
Attending: SPECIALIST
Payer: COMMERCIAL

## 2023-09-12 DIAGNOSIS — G56.03 CARPAL TUNNEL SYNDROME, BILATERAL: ICD-10-CM

## 2023-09-12 PROCEDURE — 95886 MUSC TEST DONE W/N TEST COMP: CPT | Performed by: SPECIALIST

## 2023-09-12 PROCEDURE — 95911 NRV CNDJ TEST 9-10 STUDIES: CPT | Mod: 26 | Performed by: SPECIALIST

## 2023-09-12 PROCEDURE — 95911 NRV CNDJ TEST 9-10 STUDIES: CPT | Performed by: SPECIALIST

## 2023-09-12 PROCEDURE — 95886 MUSC TEST DONE W/N TEST COMP: CPT | Mod: 26 | Performed by: SPECIALIST

## 2023-09-12 NOTE — PROCEDURES
NERVE CONDUCTION STUDIES AND ELECTROMYOGRAPHY REPORT        09/12/23      Referring provider: Warner Rogers M.D.      SUMMARY OF PATIENT'S CLINICAL HISTORY,PHYSICAL EXAM, AND RATIONALE FOR TESTING:    Ms. Marissa Molina 42 y.o. presenting with right upper extremity numbness and pain.    Past Medical History is significant for :   Past Medical History:   Diagnosis Date    Patient denies medical problems        The electrodiagnostic studies were performed to evaluate for possible peripheral neuropathy versus radiculopathy.      ELECTRODIAGNOSTIC EXAMINATION:  Nerve conduction studies (NCS) and electromyography (EMG) are utilized to evaluate direct or indirect damage to the peripheral nervous system. NCS are performed to measure the nerve(s) response(s) to electrostimulation across a given nerve segment. EMG evaluates the passive and active electrical activity of the muscle(s) in question.  Muscles are innervated by specific peripheral nerves and roots. Often times, several nerves the muscle to be examined in order to determine the presence or absence of the disease process. Furthermore, nerves and muscles may need to be tested in a [id number] comparison, as well as in additional extremities, as this may be crucial in characterizing the extent of the disease process, which may be diffuse or isolated and of varying degree of severity. The extent of the neurodiagnostic exam is justified as it may help arrive to a proper diagnosis, which ultimately may contribute to better management of the patient. Therefore, the nerves to muscles examined during the study were medically necessary.    Unless otherwise noted, temperature of the extremity(s) study was monitored before and during the examination and remained between 32 and 36 degrees C for the upper extremities, and between 30 and 36 degrees C for the lower extremities.      NERVE CONDUCTION STUDIES:  Sensory nerves:   - Bilateral Median sensory nerves were  examined.The response on the left was within normal limits.  The response on the right was abnormal, onset latency was prolonged at 3.9 ms and amplitude was decreased to 8.9 µV.  - Bilateral Ulnar sensory nerves were examined. The responses were within normal limits.  -Left median/ulnar palmar comparison study was examined.  The median response was abnormal and prolonged at 2.7 µV, the ulnar response was normal.    Motor nerves:   - Bilateral Median motor nerves were examined. Recording electrodes placed at the Abductor Pollicis Brevis muscles. The response left was within normal limits.  The response on the right was abnormal, onset latency was prolonged at 5.4 ms with a normal amplitude and conduction velocity.  - Bilateral Ulnar motor nerves were examined. Recording electrodes placed at the Abductor Digiti Minimi muscles. The responses were within normal limits.      ELECTROMYOGRAPHY:  The study was performed the concentric needle electrode. Fibrillation and fasciculation activity is graded by convention from none (0) to continuous (4+).  Needle electrode examination was performed in the following muscles: Right deltoid, biceps, triceps, first dorsal interosseous, abductor pollicis brevis.  Acute denervation changes were noted, there is no increased insertional activity fibrillation potentials or positive sharp waves.  Mild chronic neuropathic changes with decreased recruitment of long-duration potentials was noted in the abductor pollicis brevis muscle on the right only.  Other muscles studied were normal electrophysiologically.      Nerve Conduction Studies     Stim Site NR Onset (ms) Norm Onset (ms) O-P Amp (µV) Norm O-P Amp Site1 Site2 Delta-P (ms) Dist (cm) Harlan (m/s) Norm Harlan (m/s)   Left Median Anti Sensory (2nd Digit)   Wrist    3.1 <3.4 23.3 >20 Wrist 2nd Digit 3.8 14.0 *37 >50   Right Median Anti Sensory (2nd Digit)   Wrist    *3.9 <3.4 *8.9 >20 Wrist 2nd Digit 5.0 14.0 *28 >50   Left Ulnar Anti Sensory  (5th Digit)   Wrist    2.2 <3.1 20.1 >12 Wrist 5th Digit 3.0 14.0 *47 >50   Right Ulnar Anti Sensory (5th Digit)   Wrist    2.3 <3.1 15.2 >12 Wrist 5th Digit 2.8 14.0 50 >50        Stim Site NR Onset (ms) Norm Onset (ms) O-P Amp (mV) Norm O-P Amp Site1 Site2 Delta-0 (ms) Dist (cm) Harlan (m/s) Norm Harlan (m/s)   Left Median Motor (Abd Poll Brev)   Wrist    3.7 <3.9 13.5 >6 Elbow Wrist 4.3 25.0 58 >50   Elbow    8.0  9.7          Right Median Motor (Abd Poll Brev)   Wrist    *5.4 <3.9 10.8 >6 Elbow Wrist 4.6 25.0 54 >50   Elbow    10.0  9.8          Left Ulnar Motor (Abd Dig Min)   Wrist    2.5 <3.1 9.7 >7 B Elbow Wrist 3.7 19.0 51 >50   B Elbow    6.2  8.6  A Elbow B Elbow 1.4 10.0 71    A Elbow    7.6  9.6          Right Ulnar Motor (Abd Dig Min)   Wrist    2.7 <3.1 11.8 >7 B Elbow Wrist 3.2 21.0 66 >50   B Elbow    5.9  10.8  A Elbow B Elbow 1.3 10.0 77    A Elbow    7.2  8.9               Stim Site NR Peak (ms) Norm Peak (ms) P-T Amp (µV) Site1 Site2 Delta-P (ms) Norm Delta (ms)   Left Median/Ulnar Palm Comparison (Wrist - 8cm)   Median Palm    *2.7 <2.3 13.4 Median Palm Ulnar Palm *0.8 <0.3   Ulnar Palm    1.9 <2.3 16.6                              Electromyography     Side Muscle Nerve Root Ins Act Fibs Psw Amp Dur Poly Recrt Int Pat Comment   Left Deltoid Axillary C5-6 Nml Nml Nml Nml Nml 0 Nml Nml    Left Biceps Musculocut C5-6 Nml Nml Nml Nml Nml 0 Nml Nml    Left Triceps Radial C6-7-8 Nml Nml Nml Nml Nml 0 Nml Nml    Left 1stDorInt Ulnar C8-T1 Nml Nml Nml Nml Nml 0 Nml Nml    Left Abd Poll Brev Median C8-T1 Nml Nml Nml Nml Nml 0 Nml Nml    Right Deltoid Axillary C5-6 Nml Nml Nml Nml Nml 0 Nml Nml    Right Biceps Musculocut C5-6 Nml Nml Nml Nml Nml 0 Nml Nml    Right Triceps Radial C6-7-8 Nml Nml Nml Nml Nml 0 Nml Nml    Right 1stDorInt Ulnar C8-T1 Nml Nml Nml Nml Nml 0 Nml Nml    Right Abd Poll Brev Median C8-T1 Nml Nml Nml Nml *>12ms 0 *Reduced *75%            DIAGNOSTIC INTERPRETATION:   Extensive  electrodiagnostic studies were performed to the upper extremities.  The results are as follows:    1.  Right carpal tunnel syndrome which is of moderate severity electrophysiologically and associated with mild chronic neuropathic changes in median nerve supplied hand muscles.    2.  Left carpal tunnel syndrome which is mild electrophysiologically and not associated with motor unit changes in median nerve supplied hand muscles.    3.  Normal bilateral ulnar nerve motor and sensory conduction studies.    4.  No evidence of radiculopathy in selected muscles studied bilateral upper extremities.        LEANA Verma M.D. (No note.)

## 2023-11-03 ENCOUNTER — DOCUMENTATION (OUTPATIENT)
Dept: HEALTH INFORMATION MANAGEMENT | Facility: OTHER | Age: 43
End: 2023-11-03
Payer: COMMERCIAL

## 2024-01-01 ENCOUNTER — OFFICE VISIT (OUTPATIENT)
Dept: URGENT CARE | Facility: CLINIC | Age: 44
End: 2024-01-01
Payer: COMMERCIAL

## 2024-01-01 VITALS
RESPIRATION RATE: 20 BRPM | TEMPERATURE: 99.2 F | SYSTOLIC BLOOD PRESSURE: 126 MMHG | OXYGEN SATURATION: 96 % | WEIGHT: 245 LBS | HEIGHT: 67 IN | BODY MASS INDEX: 38.45 KG/M2 | HEART RATE: 112 BPM | DIASTOLIC BLOOD PRESSURE: 70 MMHG

## 2024-01-01 DIAGNOSIS — J06.9 VIRAL URI WITH COUGH: ICD-10-CM

## 2024-01-01 LAB
FLUAV RNA SPEC QL NAA+PROBE: POSITIVE
FLUBV RNA SPEC QL NAA+PROBE: NEGATIVE
RSV RNA SPEC QL NAA+PROBE: NEGATIVE
SARS-COV-2 RNA RESP QL NAA+PROBE: NEGATIVE

## 2024-01-01 PROCEDURE — 3074F SYST BP LT 130 MM HG: CPT | Performed by: FAMILY MEDICINE

## 2024-01-01 PROCEDURE — 99213 OFFICE O/P EST LOW 20 MIN: CPT | Performed by: FAMILY MEDICINE

## 2024-01-01 PROCEDURE — 3078F DIAST BP <80 MM HG: CPT | Performed by: FAMILY MEDICINE

## 2024-01-01 PROCEDURE — 0241U POCT CEPHEID COV-2, FLU A/B, RSV - PCR: CPT | Performed by: FAMILY MEDICINE

## 2024-01-01 RX ORDER — OSELTAMIVIR PHOSPHATE 75 MG/1
75 CAPSULE ORAL 2 TIMES DAILY
Qty: 10 CAPSULE | Refills: 0 | Status: SHIPPED | OUTPATIENT
Start: 2024-01-01 | End: 2024-01-06

## 2024-01-01 ASSESSMENT — FIBROSIS 4 INDEX: FIB4 SCORE: 0.75

## 2024-01-01 NOTE — PROGRESS NOTES
CC:  cough           Chief Complaint   Patient presents with    Cough     X1day Body aches/fever/chills/nasal congestion/         Cough  This is a new problem. The current episode started yesterday. The problem has been unchanged. The problem occurs constantly. The cough is dry. Associated symptoms include : fatigue, headaches, chills, muscle aches, fever. Pertinent negatives include no   nausea, vomiting, diarrhea, sweats, weight loss or wheezing. Nothing aggravates the symptoms.  Patient has tried nothing for the symptoms. There is no history of asthma.        Past Medical History:   Diagnosis Date    Patient denies medical problems          Social History     Tobacco Use    Smoking status: Former     Current packs/day: 0.00     Average packs/day: 0.8 packs/day for 20.0 years (15.0 ttl pk-yrs)     Types: Cigarettes     Start date: 2001     Quit date: 2021     Years since quitting: 3.0    Smokeless tobacco: Never   Vaping Use    Vaping Use: Never used   Substance Use Topics    Alcohol use: Yes     Comment: occ     Drug use: Never           Family History   Problem Relation Age of Onset    No Known Problems Mother     No Known Problems Father     No Known Problems Sister     No Known Problems Sister     No Known Problems Brother     Ovarian Cancer Neg Hx     Tubal Cancer Neg Hx     Peritoneal Cancer Neg Hx     Colorectal Cancer Neg Hx     Breast Cancer Neg Hx     Cancer Neg Hx     Diabetes Neg Hx     Heart Disease Neg Hx     Hypertension Neg Hx     Hyperlipidemia Neg Hx     Stroke Neg Hx                     Review of Systems   Constitutional: positive for fever and chills  HENT: negative for otalgia, sore throat  Cardiovascular - denies chest pain or dyspnea  Respiratory: Positive for cough.  .  Negative for wheezing.    Neurological: Negative for headaches, dizziness   GI - denies nausea, vomiting or diarrhea   - denies dysuria, discharge  Psych - denies depression, anxiety  Neuro - denies numbness or tingling.  "  10 point ROS otherwise negative, except per HPI             Objective:     /70 (BP Location: Left arm, Patient Position: Sitting)   Pulse (!) 112   Temp 37.3 °C (99.2 °F) (Temporal)   Resp 20   Ht 1.702 m (5' 7\")   Wt 111 kg (245 lb)   SpO2 96%       Physical Exam   Constitutional: patient is oriented to person, place, and time. Patient appears well-developed and well-nourished. No distress.   HENT:   Head: Normocephalic and atraumatic.   Right Ear: External ear normal.   Left Ear: External ear normal.   TMs normal  Nose: Mucosal edema  present. Right sinus exhibits no maxillary sinus tenderness. Left sinus exhibits no maxillary sinus tenderness.   Mouth/Throat: Mucous membranes are normal. No oral lesions.  No posterior pharyngeal erythema.  No oropharyngeal exudate or posterior oropharyngeal edema.   Eyes: Conjunctivae and EOM are normal. Pupils are equal, round, and reactive to light. Right eye exhibits no discharge. Left eye exhibits no discharge. No scleral icterus.   Neck: Normal range of motion. Neck supple. No tracheal deviation present.   Cardiovascular: Normal rate, regular rhythm and normal heart sounds.  Exam reveals no friction rub.    Pulmonary/Chest: Effort normal. No respiratory distress. Patient has no wheezes or rhonchi. Patient has no rales.    Musculoskeletal:  exhibits no edema.   Lymphadenopathy:     Patient has no cervical adenopathy.      Neurological: patient is alert and oriented to person, place, and time.   Skin: Skin is warm and dry. No rash noted. No erythema.   Psychiatric: patient  has a normal mood and affect.  behavior is normal.   Nursing note and vitals reviewed.          Assesment/Plan:    Positive for Influenza A    1. Viral URI with cough     - oseltamivir (TAMIFLU) 75 MG Cap; Take 1 Capsule by mouth 2 times a day for 5 days.  Dispense: 10 Capsule; Refill: 0    Differential diagnosis, natural history, supportive care, and indications for immediate follow-up " discussed. All questions answered. Patient agrees with the plan of care.     Follow-up as needed if symptoms worsen or fail to improve to PCP, Urgent care or Emergency Room.     I have personally reviewed prior external notes and test results pertinent to today's visit.  I have independently reviewed and interpreted all diagnostics ordered during this urgent care acute visit.

## 2024-01-29 ENCOUNTER — TELEPHONE (OUTPATIENT)
Dept: HEALTH INFORMATION MANAGEMENT | Facility: OTHER | Age: 44
End: 2024-01-29
Payer: COMMERCIAL

## 2024-08-23 ENCOUNTER — DOCUMENTATION (OUTPATIENT)
Dept: HEALTH INFORMATION MANAGEMENT | Facility: OTHER | Age: 44
End: 2024-08-23
Payer: COMMERCIAL